# Patient Record
Sex: MALE | Race: WHITE | NOT HISPANIC OR LATINO | Employment: OTHER | URBAN - METROPOLITAN AREA
[De-identification: names, ages, dates, MRNs, and addresses within clinical notes are randomized per-mention and may not be internally consistent; named-entity substitution may affect disease eponyms.]

---

## 2017-03-07 DIAGNOSIS — E78.5 HYPERLIPIDEMIA: ICD-10-CM

## 2017-03-07 RX ORDER — ATORVASTATIN CALCIUM 20 MG/1
TABLET, FILM COATED ORAL
Qty: 90 TABLET | Refills: 0 | Status: SHIPPED | OUTPATIENT
Start: 2017-03-07 | End: 2017-06-01 | Stop reason: SDUPTHER

## 2017-06-01 DIAGNOSIS — E78.5 HYPERLIPIDEMIA: ICD-10-CM

## 2017-06-01 RX ORDER — ATORVASTATIN CALCIUM 20 MG/1
TABLET, FILM COATED ORAL
Qty: 90 TABLET | Refills: 0 | Status: SHIPPED | OUTPATIENT
Start: 2017-06-01 | End: 2017-09-11 | Stop reason: SDUPTHER

## 2017-09-11 DIAGNOSIS — E78.5 HYPERLIPIDEMIA: ICD-10-CM

## 2017-09-11 RX ORDER — ATORVASTATIN CALCIUM 20 MG/1
TABLET, FILM COATED ORAL
Qty: 90 TABLET | Refills: 0 | Status: SHIPPED | OUTPATIENT
Start: 2017-09-11 | End: 2017-12-28 | Stop reason: SDUPTHER

## 2017-09-11 RX ORDER — CARVEDILOL 25 MG/1
TABLET ORAL
Qty: 180 TABLET | Refills: 0 | Status: SHIPPED | OUTPATIENT
Start: 2017-09-11 | End: 2017-12-28 | Stop reason: SDUPTHER

## 2017-12-28 DIAGNOSIS — E78.5 HYPERLIPIDEMIA: ICD-10-CM

## 2017-12-28 RX ORDER — CARVEDILOL 25 MG/1
TABLET ORAL
Qty: 180 TABLET | Refills: 0 | Status: SHIPPED | OUTPATIENT
Start: 2017-12-28 | End: 2018-07-29 | Stop reason: SDUPTHER

## 2017-12-28 RX ORDER — ATORVASTATIN CALCIUM 20 MG/1
TABLET, FILM COATED ORAL
Qty: 90 TABLET | Refills: 0 | Status: SHIPPED | OUTPATIENT
Start: 2017-12-28 | End: 2018-03-31 | Stop reason: SDUPTHER

## 2018-01-19 ENCOUNTER — OFFICE VISIT (OUTPATIENT)
Dept: CARDIOLOGY | Facility: CLINIC | Age: 63
End: 2018-01-19

## 2018-01-19 VITALS
SYSTOLIC BLOOD PRESSURE: 156 MMHG | HEART RATE: 71 BPM | DIASTOLIC BLOOD PRESSURE: 102 MMHG | BODY MASS INDEX: 32.18 KG/M2 | HEIGHT: 67 IN | WEIGHT: 205 LBS

## 2018-01-19 DIAGNOSIS — I48.0 PAROXYSMAL ATRIAL FIBRILLATION (HCC): Primary | ICD-10-CM

## 2018-01-19 DIAGNOSIS — I10 ESSENTIAL HYPERTENSION: ICD-10-CM

## 2018-01-19 DIAGNOSIS — E78.5 HYPERLIPIDEMIA, UNSPECIFIED HYPERLIPIDEMIA TYPE: ICD-10-CM

## 2018-01-19 DIAGNOSIS — E66.9 OBESITY (BMI 30-39.9): ICD-10-CM

## 2018-01-19 PROCEDURE — 93000 ELECTROCARDIOGRAM COMPLETE: CPT | Performed by: NURSE PRACTITIONER

## 2018-01-19 PROCEDURE — 99214 OFFICE O/P EST MOD 30 MIN: CPT | Performed by: NURSE PRACTITIONER

## 2018-01-19 NOTE — PROGRESS NOTES
Date of Office Visit: 2018  Encounter Provider: ROSEANNE Ashby  Place of Service: [facility]  Patient Name: Elmer Groves  :1955    Chief Complaint   Patient presents with   • Atrial Fibrillation   :     HPI: Elmer Groves is a 62 y.o. male who is a patient of Dr. David's with a past medical history of PAF, s/p PVI x 2, HLD, HTN and obesity. He was last seen by Dr. David in 2016 and was doing well with no episodes at that time. He presents today for routine follow up.     He is doing well. No palpitations or AF episodes. He denies chest pain, shortness of breath, dizziness or syncope. 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 No issues. He has gained a few pounds since he was last here.        Past Medical History:   Diagnosis Date   • Anal fistula     s/p colorectal eval 2015, pt refused intervention   • Atrial fibrillation    • Atrial fibrillation     s/p pulmonary vein isoloation x2; sees Dr. David   • Atrial fibrillation with RVR    • Hyperlipidemia    • Hypertension    • Impaired fasting glucose    • Obesity        Past Surgical History:   Procedure Laterality Date   • ANAL FISTULOTOMY  2016   • CARDIAC CATHETERIZATION     • COLONOSCOPY  2016    POLYP   • OTHER SURGICAL HISTORY      catheter ablation atrial fibrillation 2x   • OTHER SURGICAL HISTORY  2016    INTERNAL HEMORRHOID LIGATION   • SHOULDER SURGERY Left     fell off roof, had shoulder reconstruction       Social History     Social History   • Marital status:      Spouse name: N/A   • Number of children: 3   • Years of education: 12     Occupational History   • [employer]      self-employed     Social History Main Topics   • Smoking status: Never Smoker   • Smokeless tobacco: Never Used   • Alcohol use Yes      Comment: 3 daily, past heavier use   • Drug use: No   • Sexual activity: Yes     Partners: Female     Birth control/ protection: None      Other Topics Concern   • Not on file     Social History Narrative    Diet - Variable, fast food at lunch, healthier breakfast and dinners.    Exercise - none       Family History   Problem Relation Age of Onset   • Hyperlipidemia Mother    • Coronary artery disease Father      arteriosclerotic cardiovascular disease, cerebral infarction   • Hypertension Father    • Stroke Father      cerebral infarction   • COPD Other        Review of Systems   Constitution: Negative for chills, fever, malaise/fatigue, weight gain and weight loss.   HENT: Negative for ear pain, hearing loss, nosebleeds and sore throat.    Eyes: Negative for double vision, pain and visual disturbance.   Cardiovascular: Negative for chest pain, dyspnea on exertion, irregular heartbeat, leg swelling, near-syncope, orthopnea, palpitations, paroxysmal nocturnal dyspnea and syncope.   Respiratory: Negative for cough, shortness of breath, sleep disturbances due to breathing, snoring and wheezing.    Endocrine: Negative for cold intolerance, heat intolerance and polyuria.   Skin: Negative for itching and rash.   Musculoskeletal: Negative for joint pain, joint swelling and myalgias.   Gastrointestinal: Negative for abdominal pain, diarrhea, melena, nausea and vomiting.   Genitourinary: Negative for frequency, hematuria and hesitancy.   Neurological: Negative for excessive daytime sleepiness, headaches, light-headedness, numbness, paresthesias and seizures.   Psychiatric/Behavioral: Negative for altered mental status and depression.   Allergic/Immunologic: Negative.    All other systems reviewed and are negative.      No Known Allergies      Current Outpatient Prescriptions:   •  acetaminophen (TYLENOL) 500 MG tablet, 2 tabs PO Q 8 hours PRN, Disp: 30 tablet, Rfl: 0  •  aspirin 81 MG tablet, Take 1 tablet by mouth daily., Disp: , Rfl:   •  atorvastatin (LIPITOR) 20 MG tablet, TAKE 1 TABLET BY MOUTH EVERY DAY, Disp: 90 tablet, Rfl: 0  •  carvedilol (COREG)  "25 MG tablet, TAKE 1 TABLET BY MOUTH TWICE DAILY. FOLLOW UP FOR FURTHER REFILLS, Disp: 180 tablet, Rfl: 0     Objective:     Vitals:    01/19/18 0846   BP: (!) 156/102   Pulse: 71   Weight: 93 kg (205 lb)   Height: 170.2 cm (67.01\")     Body mass index is 32.1 kg/(m^2).    PHYSICAL EXAM:    Vitals Reviewed.   General Appearance: No acute distress, well developed and well nourished.   Eyes: Conjunctiva and lids: No erythema, swelling, or discharge. Sclera non-icteric.   HENT: Atraumatic, normocephalic. External eyes, ears, and nose normal.  Respiratory: No signs of respiratory distress. Respiration rhythm and depth normal.   Clear to auscultation. No rales, crackles, rhonchi, or wheezing auscultated.   Cardiovascular:  Jugular Venous Pressure: Normal  Heart Rate and Rhythm: Normal, Heart Sounds: Normal S1 and S2. No S3 or S4 noted.  Murmurs: No murmurs noted. No rubs, thrills, or gallops.   Arterial Pulses:  Posterior tibialis and dorsalis pedis pulses normal.   Lower Extremities: No edema noted.  Gastrointestinal:  Abdomen soft, non-distended, non-tender.  Musculoskeletal: Normal movement of extremities  Skin and Nails: General appearance normal. No pallor, cyanosis, diaphoresis. Skin temperature normal. No clubbing of fingernails.   Psychiatric: Patient alert and oriented to person, place, and time. Speech and behavior appropriate. Normal mood and affect.       ECG 12 Lead  Date/Time: 1/19/2018 9:02 AM  Performed by: MARK NEWTON  Authorized by: MARK NEWTON   Comparison: compared with previous ECG from 7/8/2016  Similar to previous ECG  Rhythm: sinus rhythm  Clinical impression: normal ECG              Assessment:       Diagnosis Plan   1. Paroxysmal atrial fibrillation     2. Essential hypertension     3. Hyperlipidemia, unspecified hyperlipidemia type     4. Obesity (BMI 30-39.9)            Plan:       1. Atrial Fibrillation and Atrial Flutter  Assessment  • The patient has paroxysmal atrial fibrillation  • " The patient's CHADS2-VASc score is 1  • A [id number] score of 1 is considered an intermediate risk for a thromboembolic event  • SR. No episodes since his second ablation. CV =1 but not episodes so per Dr. David's last note, think he is okay off AC.    Plan  • Attempt to maintain sinus rhythm  • Continue beta blocker for rhythm control    Subjective - Objective  • The patient had atrial fibrillation ablation       2. HTN, blood pressure high today in the office. He does not check his blood pressure that often. Sometimes he checks it at the [place] or somewhere like that and he thinks the last couple of times it may have been running a little high. He is going to monitor it for the next couple of weeks and he is thinking about getting a b/p cuff. If it is consistently running over 130/80 he is going to follow up with Dr. Gallegos. I did discuss the importance of lifestyle changes such as weight loss and exercise.     3. HLD, statin per Dr. Gallegos.    4. For the problem of overweight/obesity, we discussed the importance of lifestyle measures and strategies for weight loss, such as improved nutrition, regular exercise and sleep hygiene.     Return to see Dr. David in 18 months or sooner should the need arise.    As always, it has been a pleasure to participate in your patient's care.      Sincerely,         ROSEANNE Coles

## 2018-03-31 DIAGNOSIS — E78.5 HYPERLIPIDEMIA: ICD-10-CM

## 2018-04-02 RX ORDER — ATORVASTATIN CALCIUM 20 MG/1
TABLET, FILM COATED ORAL
Qty: 90 TABLET | Refills: 0 | Status: SHIPPED | OUTPATIENT
Start: 2018-04-02 | End: 2018-07-29 | Stop reason: SDUPTHER

## 2018-07-29 DIAGNOSIS — E78.5 HYPERLIPIDEMIA: ICD-10-CM

## 2018-07-30 RX ORDER — ATORVASTATIN CALCIUM 20 MG/1
TABLET, FILM COATED ORAL
Qty: 90 TABLET | Refills: 0 | Status: SHIPPED | OUTPATIENT
Start: 2018-07-30 | End: 2021-04-19

## 2018-07-30 RX ORDER — CARVEDILOL 25 MG/1
TABLET ORAL
Qty: 180 TABLET | Refills: 0 | Status: SHIPPED | OUTPATIENT
Start: 2018-07-30 | End: 2018-12-06 | Stop reason: SDUPTHER

## 2018-12-06 DIAGNOSIS — E78.5 HYPERLIPIDEMIA: ICD-10-CM

## 2018-12-06 RX ORDER — ATORVASTATIN CALCIUM 20 MG/1
TABLET, FILM COATED ORAL
Qty: 90 TABLET | Refills: 0 | OUTPATIENT
Start: 2018-12-06

## 2018-12-06 RX ORDER — CARVEDILOL 25 MG/1
TABLET ORAL
Qty: 180 TABLET | Refills: 0 | Status: SHIPPED | OUTPATIENT
Start: 2018-12-06 | End: 2019-06-10 | Stop reason: SDUPTHER

## 2019-06-10 RX ORDER — CARVEDILOL 25 MG/1
TABLET ORAL
Qty: 180 TABLET | Refills: 0 | Status: SHIPPED | OUTPATIENT
Start: 2019-06-10 | End: 2019-11-01 | Stop reason: SDUPTHER

## 2019-11-01 RX ORDER — CARVEDILOL 25 MG/1
TABLET ORAL
Qty: 180 TABLET | Refills: 0 | Status: SHIPPED | OUTPATIENT
Start: 2019-11-01 | End: 2020-03-12

## 2020-03-12 RX ORDER — CARVEDILOL 25 MG/1
TABLET ORAL
Qty: 180 TABLET | Refills: 0 | Status: SHIPPED | OUTPATIENT
Start: 2020-03-12 | End: 2020-09-14

## 2020-09-10 RX ORDER — CARVEDILOL 25 MG/1
TABLET ORAL
Qty: 180 TABLET | Refills: 0 | OUTPATIENT
Start: 2020-09-10

## 2020-09-14 RX ORDER — CARVEDILOL 25 MG/1
TABLET ORAL
Qty: 180 TABLET | Refills: 0 | Status: SHIPPED | OUTPATIENT
Start: 2020-09-14 | End: 2021-03-08

## 2021-02-19 RX ORDER — CARVEDILOL 25 MG/1
TABLET ORAL
Qty: 180 TABLET | Refills: 0 | OUTPATIENT
Start: 2021-02-19

## 2021-02-19 NOTE — TELEPHONE ENCOUNTER
Needs to go to his PCP since he is not seeing us ---if he wants to schedule appt then can fill til appt

## 2021-02-19 NOTE — TELEPHONE ENCOUNTER
Coreg 25 mg   Last office visit 1/18/2018  No office visit's have been scheduled.  Last EKG 1/19/2018

## 2021-02-22 NOTE — TELEPHONE ENCOUNTER
Patient called and made appt. It has been more than 3 years, so he is considered new. Can we refill his medication until he is seen?     Thanks  Saira

## 2021-03-08 RX ORDER — CARVEDILOL 25 MG/1
TABLET ORAL
Qty: 180 TABLET | Refills: 0 | Status: SHIPPED | OUTPATIENT
Start: 2021-03-08 | End: 2021-09-20

## 2021-03-19 ENCOUNTER — BULK ORDERING (OUTPATIENT)
Dept: CASE MANAGEMENT | Facility: OTHER | Age: 66
End: 2021-03-19

## 2021-03-19 DIAGNOSIS — Z23 IMMUNIZATION DUE: ICD-10-CM

## 2021-04-19 ENCOUNTER — OFFICE VISIT (OUTPATIENT)
Dept: CARDIOLOGY | Facility: CLINIC | Age: 66
End: 2021-04-19

## 2021-04-19 VITALS
WEIGHT: 209 LBS | HEIGHT: 67 IN | HEART RATE: 80 BPM | SYSTOLIC BLOOD PRESSURE: 152 MMHG | DIASTOLIC BLOOD PRESSURE: 92 MMHG | BODY MASS INDEX: 32.8 KG/M2

## 2021-04-19 DIAGNOSIS — I10 ESSENTIAL HYPERTENSION: ICD-10-CM

## 2021-04-19 DIAGNOSIS — I48.0 PAROXYSMAL ATRIAL FIBRILLATION (HCC): Primary | ICD-10-CM

## 2021-04-19 PROCEDURE — 93000 ELECTROCARDIOGRAM COMPLETE: CPT | Performed by: INTERNAL MEDICINE

## 2021-04-19 PROCEDURE — 99203 OFFICE O/P NEW LOW 30 MIN: CPT | Performed by: INTERNAL MEDICINE

## 2021-04-19 NOTE — PROGRESS NOTES
Date of Office Visit: 2021  Encounter Provider: Roman David MD  Place of Service: [facility]  Patient Name: Elmer Groves  : 1955    Subjective:     Encounter Date:2021      Patient ID: Elmer Groves is a 65 y.o. male who has a cc of  History of PAF and i've done two PVI, last many years ago, Before .     He feels fine and the only reason he is here is b/c we wouldn't fill his carvedilol     The patient had a good year.   No anginal chest pain,   No sig tinoco,   No soa,   No fainting,  No orthostasis.   No edema.   Exercise tolerance: good. He is a  and is active.     There have been no hospital admission since the last visit.     There have been no bleeding events.       Past Medical History:   Diagnosis Date   • Anal fistula     s/p colorectal eval 2015, pt refused intervention   • Atrial fibrillation (CMS/HCC)    • Atrial fibrillation (CMS/HCC)     s/p pulmonary vein isoloation x2; sees Dr. David   • Atrial fibrillation with RVR (CMS/HCC)    • Hyperlipidemia    • Hypertension    • Impaired fasting glucose    • Obesity        Social History     Socioeconomic History   • Marital status:      Spouse name: Not on file   • Number of children: 3   • Years of education: 12   • Highest education level: Not on file   Tobacco Use   • Smoking status: Never Smoker   • Smokeless tobacco: Never Used   Vaping Use   • Vaping Use: Never used   Substance and Sexual Activity   • Alcohol use: Yes     Comment: 3 daily, past heavier use   • Drug use: No   • Sexual activity: Yes     Partners: Female     Birth control/protection: None       Family History   Problem Relation Age of Onset   • Hyperlipidemia Mother    • Coronary artery disease Father         arteriosclerotic cardiovascular disease, cerebral infarction   • Hypertension Father    • Stroke Father         cerebral infarction   • COPD Other        Review of Systems   Constitutional: Negative for fever  "and night sweats.   HENT: Negative for ear pain and stridor.    Eyes: Negative for discharge and visual halos.   Cardiovascular: Negative for cyanosis.   Respiratory: Negative for hemoptysis and sputum production.    Hematologic/Lymphatic: Negative for adenopathy.   Skin: Negative for nail changes and unusual hair distribution.   Musculoskeletal: Positive for arthritis and back pain. Negative for gout and joint swelling.   Gastrointestinal: Negative for bowel incontinence and flatus.   Genitourinary: Negative for dysuria and flank pain.   Neurological: Negative for seizures and tremors.   Psychiatric/Behavioral: Negative for altered mental status. The patient is not nervous/anxious.             Objective:     Vitals:    04/19/21 0948   BP: 152/92   Pulse: 80   Weight: 94.8 kg (209 lb)   Height: 170.2 cm (67\")         Eyes:      General:         Right eye: No discharge.         Left eye: No discharge.   HENT:      Head: Normocephalic and atraumatic.   Neck:      Thyroid: No thyromegaly.      Vascular: No JVD.   Pulmonary:      Effort: Pulmonary effort is normal.      Breath sounds: Normal breath sounds. No rales.   Cardiovascular:      Normal rate. Regular rhythm.      No gallop.   Edema:     Peripheral edema absent.   Abdominal:      General: Bowel sounds are normal.      Palpations: Abdomen is soft.      Tenderness: There is no abdominal tenderness.   Musculoskeletal: Normal range of motion.         General: No deformity. Skin:     General: Skin is warm and dry.      Findings: No erythema.   Neurological:      Mental Status: Alert and oriented to person, place, and time.      Motor: Normal muscle tone.   Psychiatric:         Behavior: Behavior normal.         Thought Content: Thought content normal.           ECG 12 Lead    Date/Time: 4/19/2021 10:14 AM  Performed by: Roman David MD  Authorized by: Roman David MD   Comparison: compared with previous ECG   Similar to previous ECG  Rhythm: sinus rhythm  Rate: " normal  Conduction: conduction normal  ST Segments: ST segments normal  T Waves: T waves normal  QRS axis: normal    Clinical impression: normal ECG            Lab Review:       Assessment:          Diagnosis Plan   1. Paroxysmal atrial fibrillation (CMS/HCC)     2. Essential hypertension            Plan:     AF -- none since 2013.     HTN -- his blood pressure is elevated. I rec he check it at home and make fu with internist.     Body mass index is 32.73 kg/m². -- we disc weight loss.     The data now suggest no benefit for asa in patients without established ASCVD>

## 2021-09-20 RX ORDER — CARVEDILOL 25 MG/1
TABLET ORAL
Qty: 180 TABLET | Refills: 2 | Status: SHIPPED | OUTPATIENT
Start: 2021-09-20

## 2023-04-04 RX ORDER — CARVEDILOL 25 MG/1
TABLET ORAL
Qty: 180 TABLET | Refills: 0 | OUTPATIENT
Start: 2023-04-04

## 2023-04-06 RX ORDER — CARVEDILOL 25 MG/1
TABLET ORAL
Qty: 180 TABLET | Refills: 0 | OUTPATIENT
Start: 2023-04-06

## 2023-04-11 RX ORDER — CARVEDILOL 25 MG/1
TABLET ORAL
Qty: 180 TABLET | Refills: 0 | OUTPATIENT
Start: 2023-04-11

## 2023-04-12 ENCOUNTER — TELEPHONE (OUTPATIENT)
Dept: CARDIOLOGY | Facility: CLINIC | Age: 68
End: 2023-04-12

## 2023-04-12 NOTE — TELEPHONE ENCOUNTER
Caller: Elmer Groves    Relationship: Self    Best call back number: [phone]  What is the best time to reach you: ANY    Who are you requesting to speak with (clinical staff, provider,  specific staff member): CLINICAL    What was the call regarding: PATIENT WAS CALLING IN TO MAKE A FOLLOW UP APPT AS PATIENT TRIED REQUESTING REFILLS OF CARVEDILOL BUT WAS DENIED SINCE PATIENT NEEDED AN APPOINTMENT.     Do you require a callback: YES

## 2023-04-13 RX ORDER — CARVEDILOL 25 MG/1
TABLET ORAL
Qty: 180 TABLET | Refills: 0 | Status: SHIPPED | OUTPATIENT
Start: 2023-04-13

## 2023-08-21 RX ORDER — CARVEDILOL 25 MG/1
TABLET ORAL
Qty: 180 TABLET | Refills: 0 | Status: SHIPPED | OUTPATIENT
Start: 2023-08-21

## 2023-09-08 ENCOUNTER — OFFICE VISIT (OUTPATIENT)
Dept: CARDIOLOGY | Facility: CLINIC | Age: 68
End: 2023-09-08
Payer: MEDICARE

## 2023-09-08 VITALS
WEIGHT: 198 LBS | BODY MASS INDEX: 31.08 KG/M2 | HEART RATE: 72 BPM | DIASTOLIC BLOOD PRESSURE: 100 MMHG | HEIGHT: 67 IN | SYSTOLIC BLOOD PRESSURE: 152 MMHG

## 2023-09-08 DIAGNOSIS — Z86.79 HISTORY OF ATRIAL FIBRILLATION: ICD-10-CM

## 2023-09-08 DIAGNOSIS — I10 PRIMARY HYPERTENSION: Primary | ICD-10-CM

## 2023-09-08 PROCEDURE — 93000 ELECTROCARDIOGRAM COMPLETE: CPT | Performed by: INTERNAL MEDICINE

## 2023-09-08 PROCEDURE — 99214 OFFICE O/P EST MOD 30 MIN: CPT | Performed by: INTERNAL MEDICINE

## 2023-09-08 NOTE — PROGRESS NOTES
Date of Office Visit: 2023  Encounter Provider: Roman David MD  Place of Service: [facility]ARD[facility]  Patient Name: Elmer Groves  : 1955    Subjective:     Encounter Date:2023      Patient ID: Elmer Groves is a 68 y.o. male who has a cc of  history of af and I did PVI in  and no AF since. 10 years. No AF.     He has HTN and has been getting carvedilol from us. They made appt b/c of refills.     The patient had a good year.   No anginal chest pain,   No sig tinoco,   No soa,   No fainting,  No orthostasis.   No edema.   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     There have been no hospital admission since the last visit.     There have been no bleeding events.       Past Medical History:   Diagnosis Date    Anal fistula     s/p colorectal eval 2015, pt refused intervention    Atrial fibrillation     Atrial fibrillation     s/p pulmonary vein isoloation x2; sees Dr. David    Atrial fibrillation with RVR     Hyperlipidemia     Hypertension     Impaired fasting glucose     Obesity        Social History     Socioeconomic History    Marital status:     Number of children: 3    Years of education: 12   Tobacco Use    Smoking status: Never    Smokeless tobacco: Never   Vaping Use    Vaping Use: Never used   Substance and Sexual Activity    Alcohol use: Yes     Comment: 3 daily, past heavier use    Drug use: No    Sexual activity: Yes     Partners: Female     Birth control/protection: None       Family History   Problem Relation Age of Onset    Hyperlipidemia Mother     Coronary artery disease Father         arteriosclerotic cardiovascular disease, cerebral infarction    Hypertension Father     Stroke Father         cerebral infarction    COPD Other        Review of Systems   Constitutional: Negative for fever and night sweats.   HENT:  Negative for ear pain and stridor.    Eyes:  Negative for discharge and visual halos.   Cardiovascular:  Negative for  "cyanosis.   Respiratory:  Negative for hemoptysis and sputum production.    Hematologic/Lymphatic: Negative for adenopathy.   Skin:  Negative for nail changes and unusual hair distribution.   Musculoskeletal:  Negative for gout and joint swelling.   Gastrointestinal:  Negative for bowel incontinence and flatus.   Genitourinary:  Negative for dysuria and flank pain.   Neurological:  Negative for seizures and tremors.   Psychiatric/Behavioral:  Negative for altered mental status. The patient is not nervous/anxious.           Objective:     Vitals:    09/08/23 1019   BP: 152/100   Pulse: 72   Weight: 89.8 kg (198 lb)   Height: 170.2 cm (67\")         Eyes:      General:         Right eye: No discharge.         Left eye: No discharge.   HENT:      Head: Normocephalic and atraumatic.   Neck:      Thyroid: No thyromegaly.      Vascular: No JVD.   Pulmonary:      Effort: Pulmonary effort is normal.      Breath sounds: Normal breath sounds. No rales.   Cardiovascular:      Normal rate. Regular rhythm.      No gallop.    Edema:     Peripheral edema absent.   Abdominal:      General: Bowel sounds are normal.      Palpations: Abdomen is soft.      Tenderness: There is no abdominal tenderness.   Musculoskeletal: Normal range of motion.         General: No deformity. Skin:     General: Skin is warm and dry.      Findings: No erythema.   Neurological:      Mental Status: Alert and oriented to person, place, and time.      Motor: Normal muscle tone.   Psychiatric:         Behavior: Behavior normal.         Thought Content: Thought content normal.         ECG 12 Lead    Date/Time: 9/8/2023 10:59 AM  Performed by: Roman David MD  Authorized by: Roman David MD   Comparison: compared with previous ECG   Similar to previous ECG  Rhythm: sinus rhythm        Lab Review:       Assessment:          Diagnosis Plan   1. Primary hypertension        2. History of atrial fibrillation               Plan:     He had AF but none since " ablation     He has htn and I would rec getting a bp cuff and checking it at home and following up with primary care.

## 2023-09-08 NOTE — Clinical Note
Aneesh -- can you see this fella for primary care. He did not have a good experience with Dr. Gallegos. I think all he needs is some BP tablets. I did AF ablation on him and no AF for a decade.

## 2023-10-18 ENCOUNTER — OFFICE VISIT (OUTPATIENT)
Dept: FAMILY MEDICINE CLINIC | Facility: CLINIC | Age: 68
End: 2023-10-18
Payer: MEDICARE

## 2023-10-18 VITALS
SYSTOLIC BLOOD PRESSURE: 148 MMHG | WEIGHT: 193.5 LBS | HEART RATE: 74 BPM | OXYGEN SATURATION: 99 % | HEIGHT: 67 IN | DIASTOLIC BLOOD PRESSURE: 96 MMHG | BODY MASS INDEX: 30.37 KG/M2

## 2023-10-18 DIAGNOSIS — Z23 IMMUNIZATION DUE: ICD-10-CM

## 2023-10-18 DIAGNOSIS — I10 PRIMARY HYPERTENSION: Primary | ICD-10-CM

## 2023-10-18 DIAGNOSIS — Z23 NEED FOR TDAP VACCINATION: ICD-10-CM

## 2023-10-18 DIAGNOSIS — R73.01 IMPAIRED FASTING GLUCOSE: ICD-10-CM

## 2023-10-18 DIAGNOSIS — E78.5 HYPERLIPIDEMIA, UNSPECIFIED HYPERLIPIDEMIA TYPE: ICD-10-CM

## 2023-10-18 NOTE — PROGRESS NOTES
"        Nabil Young MD  Internal Medicine  [phone] (office)             10/18/2023    Patient Information  Elmer Groves                                                                                          [address] [facility] [id number]  1955        Chief Complaint   Patient presents with    Establish Care    Hypertension          History of Present Illness:    Elmer Groves is a 68 y.o. male who presents to the office to establish care with new PCP. Patient has obesity, HTN, HLD, prediabetes, and history of atrial fibrillation s/p ablation.     He is due for multiple immunizations (flu, RSV, Tdap, PCV20, Shingrix, and COVID-19). History of adenomatous polyps and was supposed to have repeat colonoscopy in 2018. He declines GI referral at this time.     Patient not exercising regularly.         No Known Allergies        Current Outpatient Medications:     carvedilol (COREG) 25 MG tablet, Take 1 tablet by mouth twice daily, Disp: 180 tablet, Rfl: 0      Social History     Socioeconomic History    Marital status:     Number of children: 3    Years of education: 12   Tobacco Use    Smoking status: Never    Smokeless tobacco: Never   Vaping Use    Vaping Use: Never used   Substance and Sexual Activity    Alcohol use: Yes     Comment: 3 daily, past heavier use    Drug use: No    Sexual activity: Yes     Partners: Female     Birth control/protection: None         Vitals:    10/18/23 0921   BP: 148/96   BP Location: Right arm   Patient Position: Sitting   Cuff Size: Adult   Pulse: 74   SpO2: 99%   Weight: 87.8 kg (193 lb 8 oz)   Height: 170.2 cm (67.01\")      Wt Readings from Last 3 Encounters:   10/18/23 87.8 kg (193 lb 8 oz)   09/08/23 89.8 kg (198 lb)   04/19/21 94.8 kg (209 lb)     Body mass index is 30.3 kg/m².      Physical Exam  Vitals reviewed.   Constitutional:       Appearance: Normal appearance. He is obese. He is not ill-appearing.   Pulmonary:      Effort: Pulmonary effort " is normal.   Neurological:      Mental Status: He is alert and oriented to person, place, and time.   Psychiatric:         Mood and Affect: Mood normal.         Behavior: Behavior normal.            Lab/other results: no recent labs    Assessment/Plan:    Diagnoses and all orders for this visit:    1. Primary hypertension (Primary)  -     Comprehensive Metabolic Panel  -     Microalbumin / Creatinine Urine Ratio - Urine, Clean Catch    2. Hyperlipidemia, unspecified hyperlipidemia type  Overview:  Baseline     Orders:  -     Lipid Panel  -     Apolipoprotein B    3. Impaired fasting glucose  -     Hemoglobin A1c    4. Need for Tdap vaccination  -     Tdap Vaccine => 8yo IM (BOOSTRIX)    5. Immunization due  -     Pneumococcal Conjugate Vaccine 20-Valent (PCV20)       Will check GFR and urine microalbumin/Cr before prescribing additional antihypertensive medications. Check labs and follow-up in a few weeks to review. Encouraged weight loss through better food choices and regular exercise (reviewed ACC/AHA recommendations). Discussed immunizations and cancer screening.

## 2023-10-25 LAB
ALBUMIN SERPL-MCNC: 4.7 G/DL (ref 3.9–4.9)
ALBUMIN/CREAT UR: <15 MG/G CREAT (ref 0–29)
ALBUMIN/GLOB SERPL: 1.7 {RATIO} (ref 1.2–2.2)
ALP SERPL-CCNC: 69 IU/L (ref 44–121)
ALT SERPL-CCNC: 29 IU/L (ref 0–44)
APO B SERPL-MCNC: 157 MG/DL
AST SERPL-CCNC: 25 IU/L (ref 0–40)
BILIRUB SERPL-MCNC: 0.7 MG/DL (ref 0–1.2)
BUN SERPL-MCNC: 15 MG/DL (ref 8–27)
BUN/CREAT SERPL: 18 (ref 10–24)
CALCIUM SERPL-MCNC: 9.9 MG/DL (ref 8.6–10.2)
CHLORIDE SERPL-SCNC: 97 MMOL/L (ref 96–106)
CHOLEST SERPL-MCNC: 288 MG/DL (ref 100–199)
CO2 SERPL-SCNC: 25 MMOL/L (ref 20–29)
CREAT SERPL-MCNC: 0.85 MG/DL (ref 0.76–1.27)
CREAT UR-MCNC: 19.6 MG/DL
EGFRCR SERPLBLD CKD-EPI 2021: 95 ML/MIN/1.73
GLOBULIN SER CALC-MCNC: 2.8 G/DL (ref 1.5–4.5)
GLUCOSE SERPL-MCNC: 115 MG/DL (ref 70–99)
HBA1C MFR BLD: 5.6 % (ref 4.8–5.6)
HDLC SERPL-MCNC: 75 MG/DL
LABORATORY COMMENT REPORT: ABNORMAL
LDLC SERPL CALC-MCNC: 203 MG/DL (ref 0–99)
MICROALBUMIN UR-MCNC: <3 UG/ML
POTASSIUM SERPL-SCNC: 5.2 MMOL/L (ref 3.5–5.2)
PROT SERPL-MCNC: 7.5 G/DL (ref 6–8.5)
SODIUM SERPL-SCNC: 135 MMOL/L (ref 134–144)
TRIGL SERPL-MCNC: 67 MG/DL (ref 0–149)
VLDLC SERPL CALC-MCNC: 10 MG/DL (ref 5–40)

## 2023-10-26 ENCOUNTER — TELEPHONE (OUTPATIENT)
Dept: FAMILY MEDICINE CLINIC | Facility: CLINIC | Age: 68
End: 2023-10-26
Payer: MEDICARE

## 2023-10-26 NOTE — TELEPHONE ENCOUNTER
----- Message from Tucker Yougn MD sent at 10/25/2023  7:30 AM EDT -----  Regarding: FW:  Please inform patient that labs are notable for extremely high cholesterol. We will review medication options in clinic. Make sure patient has follow up within next few weeks. Thanks!  ----- Message -----  From: Masoud, Reflab Results In  Sent: 10/25/2023   5:08 AM EDT  To: Tucker Young MD

## 2023-10-26 NOTE — TELEPHONE ENCOUNTER
"Relay     \"Please inform patient that labs are notable for extremely high cholesterol. We will review medication options in clinic. \"                "

## 2023-11-01 ENCOUNTER — OFFICE VISIT (OUTPATIENT)
Dept: FAMILY MEDICINE CLINIC | Facility: CLINIC | Age: 68
End: 2023-11-01
Payer: MEDICARE

## 2023-11-01 VITALS
BODY MASS INDEX: 30.48 KG/M2 | SYSTOLIC BLOOD PRESSURE: 138 MMHG | OXYGEN SATURATION: 97 % | HEART RATE: 82 BPM | RESPIRATION RATE: 16 BRPM | WEIGHT: 194.2 LBS | HEIGHT: 67 IN | DIASTOLIC BLOOD PRESSURE: 90 MMHG

## 2023-11-01 DIAGNOSIS — E78.5 HYPERLIPIDEMIA, UNSPECIFIED HYPERLIPIDEMIA TYPE: Primary | ICD-10-CM

## 2023-11-01 DIAGNOSIS — Z86.79 HISTORY OF ATRIAL FIBRILLATION: ICD-10-CM

## 2023-11-01 DIAGNOSIS — I10 PRIMARY HYPERTENSION: ICD-10-CM

## 2023-11-01 DIAGNOSIS — E66.9 OBESITY (BMI 30-39.9): ICD-10-CM

## 2023-11-01 PROCEDURE — 3080F DIAST BP >= 90 MM HG: CPT | Performed by: INTERNAL MEDICINE

## 2023-11-01 PROCEDURE — 1170F FXNL STATUS ASSESSED: CPT | Performed by: INTERNAL MEDICINE

## 2023-11-01 PROCEDURE — G0438 PPPS, INITIAL VISIT: HCPCS | Performed by: INTERNAL MEDICINE

## 2023-11-01 PROCEDURE — 1159F MED LIST DOCD IN RCRD: CPT | Performed by: INTERNAL MEDICINE

## 2023-11-01 PROCEDURE — 3075F SYST BP GE 130 - 139MM HG: CPT | Performed by: INTERNAL MEDICINE

## 2023-11-01 PROCEDURE — 1160F RVW MEDS BY RX/DR IN RCRD: CPT | Performed by: INTERNAL MEDICINE

## 2023-11-01 PROCEDURE — 99214 OFFICE O/P EST MOD 30 MIN: CPT | Performed by: INTERNAL MEDICINE

## 2023-11-01 RX ORDER — ROSUVASTATIN CALCIUM 40 MG/1
40 TABLET, COATED ORAL NIGHTLY
Qty: 90 TABLET | Refills: 3 | Status: SHIPPED | OUTPATIENT
Start: 2023-11-01 | End: 2024-10-26

## 2023-11-01 RX ORDER — AMLODIPINE BESYLATE 10 MG/1
10 TABLET ORAL DAILY
Qty: 90 TABLET | Refills: 3 | Status: SHIPPED | OUTPATIENT
Start: 2023-11-01

## 2023-11-01 RX ORDER — METOPROLOL SUCCINATE 25 MG/1
25 TABLET, EXTENDED RELEASE ORAL DAILY
Qty: 90 TABLET | Refills: 3 | Status: SHIPPED | OUTPATIENT
Start: 2023-11-01

## 2023-11-01 NOTE — ASSESSMENT & PLAN NOTE
Discontinue carvedilol in favor of Toprol XL. Since ablation no evidence of atrial fibrillation so it's not clear that he requires continued beta blocker. Will use this for the next month and plan to discontinue soon and monitor.

## 2023-11-01 NOTE — PROGRESS NOTES
The ABCs of the Annual Wellness Visit  Subsequent Medicare Wellness Visit    Subjective    Elmer Groves is a 68 y.o. male who presents for a Subsequent Medicare Wellness Visit.    The following portions of the patient's history were reviewed and   updated as appropriate: allergies, current medications, past family history, past medical history, past social history, past surgical history, and problem list.    Compared to one year ago, the patient feels his physical   health is the same.    Compared to one year ago, the patient feels his mental   health is the same.    Recent Hospitalizations:  He was not admitted to the hospital during the last year.       Current Medical Providers:  Patient Care Team:  Tucker Young MD as PCP - General (Internal Medicine)    Outpatient Medications Prior to Visit   Medication Sig Dispense Refill    carvedilol (COREG) 25 MG tablet Take 1 tablet by mouth twice daily 180 tablet 0     No facility-administered medications prior to visit.       No opioid medication identified on active medication list. I have reviewed chart for other potential  high risk medication/s and harmful drug interactions in the elderly.        Aspirin is not on active medication list.  Aspirin use is not indicated based on review of current medical condition/s. Risk of harm outweighs potential benefits.  .    Patient Active Problem List   Diagnosis    Anorectal abscess    Anorectal fistula    Hypertension    Impaired fasting glucose    Hyperlipidemia    Vitamin D deficiency    Pain of left lower extremity    Myelopathy of lumbar region    Muscle weakness of lower extremity    Lumbar disc herniation with myelopathy    Obesity (BMI 30-39.9)    History of atrial fibrillation     Advance Care Planning   Advance Care Planning     Advance Directive is not on file.  ACP discussion was held with the patient during this visit. Patient does not have an advance directive, information provided.     Objective   "  Vitals:    23 0856   BP: 138/90   BP Location: Right arm   Patient Position: Sitting   Cuff Size: Adult   Pulse: 82   Resp: 16   SpO2: 97%   Weight: 88.1 kg (194 lb 3.2 oz)   Height: 170.2 cm (67.01\")     Estimated body mass index is 30.41 kg/m² as calculated from the following:    Height as of this encounter: 170.2 cm (67.01\").    Weight as of this encounter: 88.1 kg (194 lb 3.2 oz).    BMI is >= 30 and <35. (Class 1 Obesity). The following options were offered after discussion;: exercise counseling/recommendations and nutrition counseling/recommendations      Does the patient have evidence of cognitive impairment? No    Lab Results   Component Value Date    CHLPL 288 (H) 10/18/2023    TRIG 67 10/18/2023    HDL 75 10/18/2023     (H) 10/18/2023    VLDL 10 10/18/2023    HGBA1C 5.6 10/18/2023        HEALTH RISK ASSESSMENT    Smoking Status:  Social History     Tobacco Use   Smoking Status Never   Smokeless Tobacco Never     Alcohol Consumption:  Social History     Substance and Sexual Activity   Alcohol Use Yes    Comment: 3 daily, past heavier use     Fall Risk Screen:    RAMIREZ Fall Risk Assessment was completed, and patient is at LOW risk for falls.Assessment completed on:2023    Depression Screenin/1/2023     8:55 AM   PHQ-2/PHQ-9 Depression Screening   Little Interest or Pleasure in Doing Things 0-->not at all   Feeling Down, Depressed or Hopeless 0-->not at all   PHQ-9: Brief Depression Severity Measure Score 0       Health Habits and Functional and Cognitive Screenin/1/2023     8:55 AM   Functional & Cognitive Status   Do you have difficulty preparing food and eating? No   Do you have difficulty bathing yourself, getting dressed or grooming yourself? No   Do you have difficulty using the toilet? No   Do you have difficulty moving around from place to place? No   Do you have trouble with steps or getting out of a bed or a chair? No   Current Diet Limited Junk Food   Dental " Exam Not up to date   Eye Exam Up to date   Exercise (times per week) 0 times per week   Current Exercises Include Yard Work   Do you need help using the phone?  No   Are you deaf or do you have serious difficulty hearing?  No   Do you need help to go to places out of walking distance? No   Do you need help shopping? No   Do you need help preparing meals?  No   Do you need help with housework?  No   Do you need help with laundry? No   Do you need help taking your medications? No   Do you need help managing money? No   Do you ever drive or ride in a car without wearing a seat belt? No   Have you felt unusual stress, anger or loneliness in the last month? No   Who do you live with? Spouse   If you need help, do you have trouble finding someone available to you? No   Have you been bothered in the last four weeks by sexual problems? No   Do you have difficulty concentrating, remembering or making decisions? No       Age-appropriate Screening Schedule:  Refer to the list below for future screening recommendations based on patient's age, sex and/or medical conditions. Orders for these recommended tests are listed in the plan section. The patient has been provided with a written plan.    Health Maintenance   Topic Date Due    COLORECTAL CANCER SCREENING  Never done    COVID-19 Vaccine (1) Never done    ZOSTER VACCINE (1 of 2) Never done    INFLUENZA VACCINE  03/31/2024 (Originally 8/1/2023)    LIPID PANEL  10/18/2024    ANNUAL WELLNESS VISIT  11/01/2024    BMI FOLLOWUP  11/01/2024    TDAP/TD VACCINES (3 - Td or Tdap) 10/18/2033    HEPATITIS C SCREENING  Completed    Pneumococcal Vaccine 65+  Completed                  CMS Preventative Services Quick Reference  Risk Factors Identified During Encounter  Immunizations Discussed/Encouraged: Influenza, COVID19, and RSV (Respiratory Syncytial Virus)  The above risks/problems have been discussed with the patient.  Pertinent information has been shared with the patient in the  "After Visit Summary.  An After Visit Summary and PPPS were made available to the patient.    Follow Up:   Next Medicare Wellness visit to be scheduled in 1 year.       Additional E&M Note during same encounter follows:  Patient has multiple medical problems which are significant and separately identifiable that require additional work above and beyond the Medicare Wellness Visit.      Chief Complaint  Medicare Wellness-subsequent    Subjective          Elmer Groves is also being seen today for HTN and HLD. He reports taking carvedilol once daily. This was prescribed by cardiologist after previous episode of atrial fibrillation. He underwent ablation for this.          Objective   Vital Signs:  /90 (BP Location: Right arm, Patient Position: Sitting, Cuff Size: Adult)   Pulse 82   Resp 16   Ht 170.2 cm (67.01\")   Wt 88.1 kg (194 lb 3.2 oz)   SpO2 97%   BMI 30.41 kg/m²     Physical Exam  Vitals reviewed.   Constitutional:       Appearance: Normal appearance. He is obese. He is not ill-appearing.   Neurological:      Mental Status: He is alert and oriented to person, place, and time.   Psychiatric:         Mood and Affect: Mood normal.         Behavior: Behavior normal.          The following data was reviewed by: Tucker Young MD on 11/01/2023:  Common labs          10/18/2023    10:45   Common Labs   Glucose 115    BUN 15    Creatinine 0.85    Sodium 135    Potassium 5.2    Chloride 97    Calcium 9.9    Total Protein 7.5    Albumin 4.7    Total Bilirubin 0.7    Alkaline Phosphatase 69    AST (SGOT) 25    ALT (SGPT) 29    Total Cholesterol 288    Triglycerides 67    HDL Cholesterol 75    LDL Cholesterol  203    Hemoglobin A1C 5.6    Microalbumin, Urine <3.0             Assessment and Plan   Diagnoses and all orders for this visit:    1. Hyperlipidemia, unspecified hyperlipidemia type (Primary)  Assessment & Plan:  Lipid abnormalities are unchanged.  Pharmacotherapy as ordered.  Lipids will be " reassessed in 1 year.    Orders:  -     rosuvastatin (CRESTOR) 40 MG tablet; Take 1 tablet by mouth Every Night for 360 days.  Dispense: 90 tablet; Refill: 3    2. Obesity (BMI 30-39.9)  Assessment & Plan:  Patient's (Body mass index is 30.41 kg/m².) indicates that they are obese (BMI >30) with health conditions that include hypertension, dyslipidemias, and prediabetes  . Weight is unchanged. BMI  is above average; BMI management plan is completed. We discussed portion control and increasing exercise.       3. Primary hypertension  Assessment & Plan:  Hypertension is worsening.  Dietary sodium restriction.  Weight loss.  Regular aerobic exercise.  Medication changes per orders.  Blood pressure will be reassessed in 4 weeks.    Orders:  -     amLODIPine (NORVASC) 10 MG tablet; Take 1 tablet by mouth Daily.  Dispense: 90 tablet; Refill: 3    4. History of atrial fibrillation  Assessment & Plan:  Discontinue carvedilol in favor of Toprol XL. Since ablation no evidence of atrial fibrillation so it's not clear that he requires continued beta blocker. Will use this for the next month and plan to discontinue soon and monitor.     Orders:  -     metoprolol succinate XL (Toprol XL) 25 MG 24 hr tablet; Take 1 tablet by mouth Daily.  Dispense: 90 tablet; Refill: 3             Follow Up   No follow-ups on file.  Patient was given instructions and counseling regarding his condition or for health maintenance advice. Please see specific information pulled into the AVS if appropriate.

## 2023-11-01 NOTE — ASSESSMENT & PLAN NOTE
Patient's (Body mass index is 30.41 kg/m².) indicates that they are obese (BMI >30) with health conditions that include hypertension, dyslipidemias, and prediabetes  . Weight is unchanged. BMI  is above average; BMI management plan is completed. We discussed portion control and increasing exercise.

## 2023-12-01 ENCOUNTER — OFFICE VISIT (OUTPATIENT)
Dept: FAMILY MEDICINE CLINIC | Facility: CLINIC | Age: 68
End: 2023-12-01
Payer: MEDICARE

## 2023-12-01 VITALS
HEIGHT: 67 IN | OXYGEN SATURATION: 98 % | BODY MASS INDEX: 30.53 KG/M2 | SYSTOLIC BLOOD PRESSURE: 144 MMHG | WEIGHT: 194.5 LBS | HEART RATE: 105 BPM | RESPIRATION RATE: 16 BRPM | DIASTOLIC BLOOD PRESSURE: 80 MMHG

## 2023-12-01 DIAGNOSIS — E66.9 OBESITY (BMI 30-39.9): ICD-10-CM

## 2023-12-01 DIAGNOSIS — I10 PRIMARY HYPERTENSION: Primary | ICD-10-CM

## 2023-12-01 DIAGNOSIS — Z86.79 HISTORY OF ATRIAL FIBRILLATION: ICD-10-CM

## 2023-12-01 PROCEDURE — 3079F DIAST BP 80-89 MM HG: CPT | Performed by: INTERNAL MEDICINE

## 2023-12-01 PROCEDURE — 1160F RVW MEDS BY RX/DR IN RCRD: CPT | Performed by: INTERNAL MEDICINE

## 2023-12-01 PROCEDURE — 99214 OFFICE O/P EST MOD 30 MIN: CPT | Performed by: INTERNAL MEDICINE

## 2023-12-01 PROCEDURE — 3077F SYST BP >= 140 MM HG: CPT | Performed by: INTERNAL MEDICINE

## 2023-12-01 PROCEDURE — 93000 ELECTROCARDIOGRAM COMPLETE: CPT | Performed by: INTERNAL MEDICINE

## 2023-12-01 PROCEDURE — 1159F MED LIST DOCD IN RCRD: CPT | Performed by: INTERNAL MEDICINE

## 2023-12-01 NOTE — ASSESSMENT & PLAN NOTE
Patient's (Body mass index is 30.46 kg/m².) indicates that they are obese (BMI >30) with health conditions that include hypertension and dyslipidemias . Weight is unchanged. BMI  is above average; BMI management plan is completed. We discussed portion control, increasing exercise, and joining a fitness center or start home based exercise program.

## 2023-12-01 NOTE — PROGRESS NOTES
"        Nabil Young MD  Internal Medicine  [phone] (office)             12/01/2023    Patient Information  Elmer Groves                                                                                          [address] [facility] [address]  1955        Chief Complaint   Patient presents with    Hyperlipidemia    Hypertension     1 month follow up          History of Present Illness:    Elmer Groves is a 68 y.o. male who presents to the office for follow-up. We discontinued carvedilol at last visit in favor of Toprol XL as patient was only taking carvedilol daily. Amlodipine also added for HTN control. He has tolerated without problem. Patient reports occasionally feeling lightheaded, however. He denies chest pain, dyspnea, palpitations and doesn't believe he's been having atrial fibrillation.     Patient declines CRC.    No Known Allergies        Current Outpatient Medications:     amLODIPine (NORVASC) 10 MG tablet, Take 1 tablet by mouth Daily., Disp: 90 tablet, Rfl: 3    metoprolol succinate XL (Toprol XL) 25 MG 24 hr tablet, Take 1 tablet by mouth Daily., Disp: 90 tablet, Rfl: 3    rosuvastatin (CRESTOR) 40 MG tablet, Take 1 tablet by mouth Every Night for 360 days., Disp: 90 tablet, Rfl: 3      Social History     Socioeconomic History    Marital status:     Number of children: 3    Years of education: 12   Tobacco Use    Smoking status: Never    Smokeless tobacco: Never   Vaping Use    Vaping Use: Never used   Substance and Sexual Activity    Alcohol use: Yes     Comment: 3 daily, past heavier use    Drug use: No    Sexual activity: Yes     Partners: Female     Birth control/protection: None         Vitals:    12/01/23 0924   BP: 144/80   BP Location: Right arm   Patient Position: Sitting   Cuff Size: Adult   Pulse: 105   Resp: 16   SpO2: 98%   Weight: 88.2 kg (194 lb 8 oz)   Height: 170.2 cm (67.01\")      Wt Readings from Last 3 Encounters:   12/01/23 88.2 kg (194 lb 8 oz) "   11/01/23 88.1 kg (194 lb 3.2 oz)   10/18/23 87.8 kg (193 lb 8 oz)     Body mass index is 30.46 kg/m².      Physical Exam  Vitals reviewed. Nursing note reviewed: Repeat HR 84.  Constitutional:       Appearance: Normal appearance. He is obese.   Cardiovascular:      Rate and Rhythm: Normal rate. Rhythm irregular.      Heart sounds: Normal heart sounds. No murmur heard.     No friction rub. No gallop.      Comments: Pulse irregular  Neurological:      Mental Status: He is alert and oriented to person, place, and time.   Psychiatric:         Mood and Affect: Mood normal.         Behavior: Behavior normal.            Lab/other results:  Common labs          10/18/2023    10:45   Common Labs   Glucose 115    BUN 15    Creatinine 0.85    Sodium 135    Potassium 5.2    Chloride 97    Calcium 9.9    Total Protein 7.5    Albumin 4.7    Total Bilirubin 0.7    Alkaline Phosphatase 69    AST (SGOT) 25    ALT (SGPT) 29    Total Cholesterol 288    Triglycerides 67    HDL Cholesterol 75    LDL Cholesterol  203    Hemoglobin A1C 5.6    Microalbumin, Urine <3.0      EKG - NSR with PVC noted    Assessment/Plan:    Diagnoses and all orders for this visit:    1. Primary hypertension (Primary)  Assessment & Plan:  Hypertension is improving with treatment.  Weight loss.  Regular aerobic exercise.  Continue current medications.  Blood pressure will be reassessed in 3 months.    Patient wishes to try to get BP under control with lifestyle modifications in addition to continued amlodipine 10mg daily. We'll check again in 3 months. If above goal (<130/80mmHg), will discuss additional medication options.       2. History of atrial fibrillation  -     ECG 12 Lead    3. Obesity (BMI 30-39.9)  Assessment & Plan:  Patient's (Body mass index is 30.46 kg/m².) indicates that they are obese (BMI >30) with health conditions that include hypertension and dyslipidemias . Weight is unchanged. BMI  is above average; BMI management plan is completed. We  discussed portion control, increasing exercise, and joining a fitness center or start home based exercise program.

## 2023-12-01 NOTE — ASSESSMENT & PLAN NOTE
Hypertension is improving with treatment.  Weight loss.  Regular aerobic exercise.  Continue current medications.  Blood pressure will be reassessed in 3 months.    Patient wishes to try to get BP under control with lifestyle modifications in addition to continued amlodipine 10mg daily. We'll check again in 3 months. If above goal (<130/80mmHg), will discuss additional medication options.

## 2024-03-01 ENCOUNTER — OFFICE VISIT (OUTPATIENT)
Dept: FAMILY MEDICINE CLINIC | Facility: CLINIC | Age: 69
End: 2024-03-01
Payer: MEDICARE

## 2024-03-01 VITALS
HEIGHT: 67 IN | BODY MASS INDEX: 29.58 KG/M2 | RESPIRATION RATE: 16 BRPM | SYSTOLIC BLOOD PRESSURE: 146 MMHG | OXYGEN SATURATION: 98 % | WEIGHT: 188.5 LBS | HEART RATE: 108 BPM | DIASTOLIC BLOOD PRESSURE: 84 MMHG

## 2024-03-01 DIAGNOSIS — E78.5 HYPERLIPIDEMIA, UNSPECIFIED HYPERLIPIDEMIA TYPE: ICD-10-CM

## 2024-03-01 DIAGNOSIS — I10 PRIMARY HYPERTENSION: Primary | ICD-10-CM

## 2024-03-01 DIAGNOSIS — R73.01 IMPAIRED FASTING GLUCOSE: ICD-10-CM

## 2024-03-01 DIAGNOSIS — E66.3 OVERWEIGHT (BMI 25.0-29.9): ICD-10-CM

## 2024-03-01 PROBLEM — E66.9 OBESITY (BMI 30-39.9): Status: RESOLVED | Noted: 2018-01-19 | Resolved: 2024-03-01

## 2024-03-01 NOTE — ASSESSMENT & PLAN NOTE
Encouraged continued weight loss and adherence to rosuvastatin 40mg daily. Will check labs before appointment in  months.

## 2024-03-01 NOTE — PROGRESS NOTES
"        Nabil Young MD  Internal Medicine  [phone] (office)             03/01/2024    Patient Information  Elmer Groves                                                                                          [address] [facility] [address]  1955        Chief Complaint   Patient presents with    Hyperlipidemia     3 month follow up     Hypertension          History of Present Illness:    Elmer Groves is a 68 y.o. male who presents to the office for weight and blood pressure checks. He reports weight loss is from better food choices and regular exercise. Patient reports adherence to medication regimen.       Health Maintenance Due   Topic Date Due    COLORECTAL CANCER SCREENING  Never done    COVID-19 Vaccine (1) Never done    ZOSTER VACCINE (1 of 2) Never done    RSV Vaccine - Adults (1 - 1-dose 60+ series) Never done        No Known Allergies        Current Outpatient Medications:     amLODIPine (NORVASC) 10 MG tablet, Take 1 tablet by mouth Daily., Disp: 90 tablet, Rfl: 3    metoprolol succinate XL (Toprol XL) 25 MG 24 hr tablet, Take 1 tablet by mouth Daily., Disp: 90 tablet, Rfl: 3    rosuvastatin (CRESTOR) 40 MG tablet, Take 1 tablet by mouth Every Night for 360 days., Disp: 90 tablet, Rfl: 3      Social History     Socioeconomic History    Marital status:     Number of children: 3    Years of education: 12   Tobacco Use    Smoking status: Never    Smokeless tobacco: Never   Vaping Use    Vaping Use: Never used   Substance and Sexual Activity    Alcohol use: Yes     Comment: 3 daily, past heavier use    Drug use: No    Sexual activity: Yes     Partners: Female     Birth control/protection: None         Vitals:    03/01/24 0754   BP: 146/84   BP Location: Right arm   Patient Position: Sitting   Cuff Size: Adult   Pulse: 108   Resp: 16   SpO2: 98%   Weight: 85.5 kg (188 lb 8 oz)   Height: 170.2 cm (67.01\")      Wt Readings from Last 3 Encounters:   03/01/24 85.5 kg (188 lb 8 oz) "   12/01/23 88.2 kg (194 lb 8 oz)   11/01/23 88.1 kg (194 lb 3.2 oz)     Body mass index is 29.52 kg/m².      Physical Exam  Vitals reviewed.   Constitutional:       Appearance: Normal appearance.   Pulmonary:      Effort: Pulmonary effort is normal.   Neurological:      Mental Status: He is alert and oriented to person, place, and time.   Psychiatric:         Mood and Affect: Mood normal.         Behavior: Behavior normal.            Lab/other results:  Common labs          10/18/2023    10:45   Common Labs   Glucose 115    BUN 15    Creatinine 0.85    Sodium 135    Potassium 5.2    Chloride 97    Calcium 9.9    Total Protein 7.5    Albumin 4.7    Total Bilirubin 0.7    Alkaline Phosphatase 69    AST (SGOT) 25    ALT (SGPT) 29    Total Cholesterol 288    Triglycerides 67    HDL Cholesterol 75    LDL Cholesterol  203    Hemoglobin A1C 5.6    Microalbumin, Urine <3.0        Assessment/Plan:    Diagnoses and all orders for this visit:    1. Primary hypertension (Primary)  Assessment & Plan:  BP remains above goal (<130/80mmHg) despite recent weight loss and adherence to medications. Patient will continue weight loss attempts, keep home blood pressure log, and follow-up in 3 months (offered 6 week appointment, but he'd like to wait). If above goal on home measurements, will discuss additional treatment options.     Orders:  -     Comprehensive Metabolic Panel; Future  -     Microalbumin / Creatinine Urine Ratio - Urine, Clean Catch; Future    2. Impaired fasting glucose  Assessment & Plan:  A1c 5.8% previously, last 5.6%. Will check again with next labs.     Orders:  -     Hemoglobin A1c; Future    3. Hyperlipidemia, unspecified hyperlipidemia type  Overview:  Baseline     Assessment & Plan:  Encouraged continued weight loss and adherence to rosuvastatin 40mg daily. Will check labs before appointment in  months.     Orders:  -     Lipid Panel; Future  -     Apolipoprotein B; Future    4. Overweight (BMI  25.0-29.9)  Assessment & Plan:  Patient's (Body mass index is 29.52 kg/m².) indicates that they are overweight with health conditions that include hypertension, dyslipidemias, and prediabetes  . Weight is improving with lifestyle modifications. BMI is is above average; BMI management plan is completed. We discussed portion control, increasing exercise, and joining a fitness center or start home based exercise program.        Of note, patient continues to refuse CRC screening.

## 2024-03-01 NOTE — ASSESSMENT & PLAN NOTE
Patient's (Body mass index is 29.52 kg/m².) indicates that they are overweight with health conditions that include hypertension, dyslipidemias, and prediabetes  . Weight is improving with lifestyle modifications. BMI is is above average; BMI management plan is completed. We discussed portion control, increasing exercise, and joining a fitness center or start home based exercise program.

## 2024-06-12 DIAGNOSIS — E78.5 HYPERLIPIDEMIA, UNSPECIFIED HYPERLIPIDEMIA TYPE: ICD-10-CM

## 2024-06-12 DIAGNOSIS — E66.3 OVERWEIGHT (BMI 25.0-29.9): ICD-10-CM

## 2024-06-12 DIAGNOSIS — I10 PRIMARY HYPERTENSION: Primary | ICD-10-CM

## 2024-06-12 DIAGNOSIS — R73.01 IMPAIRED FASTING GLUCOSE: ICD-10-CM

## 2024-06-16 LAB
ALBUMIN SERPL-MCNC: 4.4 G/DL (ref 3.9–4.9)
ALBUMIN/CREAT UR: 8 MG/G CREAT (ref 0–29)
ALBUMIN/GLOB SERPL: 1.6 {RATIO}
ALP SERPL-CCNC: 85 IU/L (ref 44–121)
ALT SERPL-CCNC: 24 IU/L (ref 0–44)
APO B SERPL-MCNC: 67 MG/DL
AST SERPL-CCNC: 29 IU/L (ref 0–40)
BILIRUB SERPL-MCNC: 0.4 MG/DL (ref 0–1.2)
BUN SERPL-MCNC: 8 MG/DL (ref 8–27)
BUN/CREAT SERPL: 10 (ref 10–24)
CALCIUM SERPL-MCNC: 9.9 MG/DL (ref 8.6–10.2)
CHLORIDE SERPL-SCNC: 101 MMOL/L (ref 96–106)
CHOLEST SERPL-MCNC: 160 MG/DL (ref 100–199)
CO2 SERPL-SCNC: 23 MMOL/L (ref 20–29)
CREAT SERPL-MCNC: 0.77 MG/DL (ref 0.76–1.27)
CREAT UR-MCNC: 51.6 MG/DL
EGFRCR SERPLBLD CKD-EPI 2021: 98 ML/MIN/1.73
GLOBULIN SER CALC-MCNC: 2.8 G/DL (ref 1.5–4.5)
GLUCOSE SERPL-MCNC: 115 MG/DL (ref 70–99)
HBA1C MFR BLD: 5.7 % (ref 4.8–5.6)
HDLC SERPL-MCNC: 66 MG/DL
LDLC SERPL CALC-MCNC: 78 MG/DL (ref 0–99)
LDLC/HDLC SERPL: 1.2 RATIO (ref 0–3.6)
MICROALBUMIN UR-MCNC: 4.1 UG/ML
POTASSIUM SERPL-SCNC: 4.6 MMOL/L (ref 3.5–5.2)
PROT SERPL-MCNC: 7.2 G/DL (ref 6–8.5)
SODIUM SERPL-SCNC: 138 MMOL/L (ref 134–144)
TRIGL SERPL-MCNC: 87 MG/DL (ref 0–149)
VLDLC SERPL CALC-MCNC: 16 MG/DL (ref 5–40)

## 2024-07-10 ENCOUNTER — OFFICE VISIT (OUTPATIENT)
Dept: FAMILY MEDICINE CLINIC | Facility: CLINIC | Age: 69
End: 2024-07-10
Payer: MEDICARE

## 2024-07-10 VITALS
BODY MASS INDEX: 28.36 KG/M2 | HEIGHT: 67 IN | RESPIRATION RATE: 16 BRPM | DIASTOLIC BLOOD PRESSURE: 78 MMHG | WEIGHT: 180.7 LBS | OXYGEN SATURATION: 98 % | TEMPERATURE: 96.8 F | HEART RATE: 100 BPM | SYSTOLIC BLOOD PRESSURE: 140 MMHG

## 2024-07-10 DIAGNOSIS — E78.5 HYPERLIPIDEMIA, UNSPECIFIED HYPERLIPIDEMIA TYPE: ICD-10-CM

## 2024-07-10 DIAGNOSIS — Z12.5 PROSTATE CANCER SCREENING: ICD-10-CM

## 2024-07-10 DIAGNOSIS — I10 PRIMARY HYPERTENSION: Primary | ICD-10-CM

## 2024-07-10 DIAGNOSIS — R73.09 ABNORMAL GLUCOSE: ICD-10-CM

## 2024-07-10 PROCEDURE — 3077F SYST BP >= 140 MM HG: CPT | Performed by: STUDENT IN AN ORGANIZED HEALTH CARE EDUCATION/TRAINING PROGRAM

## 2024-07-10 PROCEDURE — 1159F MED LIST DOCD IN RCRD: CPT | Performed by: STUDENT IN AN ORGANIZED HEALTH CARE EDUCATION/TRAINING PROGRAM

## 2024-07-10 PROCEDURE — 99214 OFFICE O/P EST MOD 30 MIN: CPT | Performed by: STUDENT IN AN ORGANIZED HEALTH CARE EDUCATION/TRAINING PROGRAM

## 2024-07-10 PROCEDURE — 1160F RVW MEDS BY RX/DR IN RCRD: CPT | Performed by: STUDENT IN AN ORGANIZED HEALTH CARE EDUCATION/TRAINING PROGRAM

## 2024-07-10 PROCEDURE — 3078F DIAST BP <80 MM HG: CPT | Performed by: STUDENT IN AN ORGANIZED HEALTH CARE EDUCATION/TRAINING PROGRAM

## 2024-07-10 PROCEDURE — 1126F AMNT PAIN NOTED NONE PRSNT: CPT | Performed by: STUDENT IN AN ORGANIZED HEALTH CARE EDUCATION/TRAINING PROGRAM

## 2024-07-10 NOTE — PROGRESS NOTES
"Chief Complaint  Hypertension and Establish Care    Subjective        Elmer Groves presents to [facility]  History of Present Illness  Establish medical care  Pt stated his previous PCP was working on his Blood pressure.  He is checking his BP at home, systolic is coming in 120s and diastolic below 80s  Review of system is negative for fever, headache, chest pain, shortness of breath, palpitation, nausea, vomiting, any recent change in bladder habits.    Objective   Vital Signs:  /78 (BP Location: Left arm, Patient Position: Sitting, Cuff Size: Adult)   Pulse 100   Temp 96.8 °F (36 °C) (Temporal)   Resp 16   Ht 170.2 cm (67.01\")   Wt 82 kg (180 lb 11.2 oz)   SpO2 98%   BMI 28.29 kg/m²   Estimated body mass index is 28.29 kg/m² as calculated from the following:    Height as of this encounter: 170.2 cm (67.01\").    Weight as of this encounter: 82 kg (180 lb 11.2 oz).               Physical Exam  Cardiovascular:      Rate and Rhythm: Normal rate.      Pulses: Normal pulses.      Heart sounds: Normal heart sounds.   Pulmonary:      Effort: Pulmonary effort is normal.      Breath sounds: Normal breath sounds.   Abdominal:      General: Bowel sounds are normal.      Palpations: Abdomen is soft.   Musculoskeletal:         General: Normal range of motion.   Neurological:      Mental Status: He is alert and oriented to person, place, and time.        Result Review :    The following data was reviewed by: Kurt Gomez MD on 07/10/2024:  CMP          10/18/2023    10:45 6/13/2024    10:13   CMP   Glucose 115  115    BUN 15  8    Creatinine 0.85  0.77    Sodium 135  138    Potassium 5.2  4.6    Chloride 97  101    Calcium 9.9  9.9    Total Protein 7.5  7.2    Albumin 4.7  4.4    Globulin 2.8  2.8    Total Bilirubin 0.7  0.4    Alkaline Phosphatase 69  85    AST (SGOT) 25  29    ALT (SGPT) 29  24    BUN/Creatinine Ratio 18  10        Lipid Panel          10/18/2023    10:45 " 6/13/2024    10:13   Lipid Panel   Total Cholesterol 288  160    Triglycerides 67  87    HDL Cholesterol 75  66    VLDL Cholesterol 10  16    LDL Cholesterol  203  78    LDL/HDL Ratio  1.2                     Assessment and Plan     Diagnoses and all orders for this visit:    1. Primary hypertension (Primary)  -     CBC Auto Differential; Future  -     Comprehensive Metabolic Panel; Future  -     Lipid Panel With / Chol / HDL Ratio; Future  -     PSA SCREENING; Future    2. Hyperlipidemia, unspecified hyperlipidemia type  -     CBC Auto Differential; Future  -     Comprehensive Metabolic Panel; Future  -     Lipid Panel With / Chol / HDL Ratio; Future  -     PSA SCREENING; Future    3. Prostate cancer screening  -     PSA SCREENING; Future    4. Abnormal glucose  -     Hemoglobin A1c; Future    # labs reviewed   Glucose 115 Hemoglobin A1c has increased 5.7., recommend low carb diet and exercise as tolerated, more fruits and vegetables in diet  # HLD  LDL improved from 203 to 78  On statins, continue rosuvastatin 40 mg PO daily  # HTN  Well controlled at home, office readings on higher side but at home systolic in 120s as per pt  Continue amlodipine and metoprolol  RTC in 6 months for wellness check up.  Discussed about colonoscopy pt not decided yet           Follow Up     No follow-ups on file.  Patient was given instructions and counseling regarding his condition or for health maintenance advice. Please see specific information pulled into the AVS if appropriate.

## 2024-07-30 ENCOUNTER — APPOINTMENT (OUTPATIENT)
Dept: GENERAL RADIOLOGY | Facility: HOSPITAL | Age: 69
End: 2024-07-30
Payer: MEDICARE

## 2024-07-30 ENCOUNTER — APPOINTMENT (OUTPATIENT)
Dept: CT IMAGING | Facility: HOSPITAL | Age: 69
End: 2024-07-30
Payer: MEDICARE

## 2024-07-30 ENCOUNTER — HOSPITAL ENCOUNTER (EMERGENCY)
Facility: HOSPITAL | Age: 69
Discharge: HOME | End: 2024-07-30
Attending: EMERGENCY MEDICINE
Payer: MEDICARE

## 2024-07-30 VITALS
SYSTOLIC BLOOD PRESSURE: 140 MMHG | OXYGEN SATURATION: 98 % | TEMPERATURE: 97.2 F | DIASTOLIC BLOOD PRESSURE: 102 MMHG | HEIGHT: 67 IN | WEIGHT: 180.78 LBS | RESPIRATION RATE: 16 BRPM | BODY MASS INDEX: 28.37 KG/M2 | HEART RATE: 78 BPM

## 2024-07-30 DIAGNOSIS — R03.0 ELEVATED BLOOD PRESSURE READING: ICD-10-CM

## 2024-07-30 DIAGNOSIS — T67.5XXA HEAT EXHAUSTION, INITIAL ENCOUNTER: Primary | ICD-10-CM

## 2024-07-30 DIAGNOSIS — R00.0 SINUS TACHYCARDIA: ICD-10-CM

## 2024-07-30 DIAGNOSIS — Z86.79 HISTORY OF ATRIAL FIBRILLATION: ICD-10-CM

## 2024-07-30 LAB
ALBUMIN SERPL-MCNC: 4.9 G/DL (ref 3.5–5.2)
ALBUMIN/GLOB SERPL: 1.6 G/DL
ALP SERPL-CCNC: 80 U/L (ref 39–117)
ALT SERPL W P-5'-P-CCNC: 27 U/L (ref 1–41)
ANION GAP SERPL CALCULATED.3IONS-SCNC: 15.1 MMOL/L (ref 5–15)
AST SERPL-CCNC: 30 U/L (ref 1–40)
BASOPHILS # BLD AUTO: 0.04 10*3/MM3 (ref 0–0.2)
BASOPHILS NFR BLD AUTO: 0.4 % (ref 0–1.5)
BILIRUB SERPL-MCNC: 0.6 MG/DL (ref 0–1.2)
BILIRUB UR QL STRIP: NEGATIVE
BUN SERPL-MCNC: 10 MG/DL (ref 8–23)
BUN/CREAT SERPL: 13 (ref 7–25)
CALCIUM SPEC-SCNC: 9.6 MG/DL (ref 8.6–10.5)
CHLORIDE SERPL-SCNC: 99 MMOL/L (ref 98–107)
CLARITY UR: CLEAR
CO2 SERPL-SCNC: 22.9 MMOL/L (ref 22–29)
COLOR UR: YELLOW
CREAT SERPL-MCNC: 0.77 MG/DL (ref 0.76–1.27)
DEPRECATED RDW RBC AUTO: 42.3 FL (ref 37–54)
EGFRCR SERPLBLD CKD-EPI 2021: 98.1 ML/MIN/1.73
EOSINOPHIL # BLD AUTO: 0.02 10*3/MM3 (ref 0–0.4)
EOSINOPHIL NFR BLD AUTO: 0.2 % (ref 0.3–6.2)
ERYTHROCYTE [DISTWIDTH] IN BLOOD BY AUTOMATED COUNT: 12.2 % (ref 12.3–15.4)
GLOBULIN UR ELPH-MCNC: 3.1 GM/DL
GLUCOSE SERPL-MCNC: 134 MG/DL (ref 65–99)
GLUCOSE UR STRIP-MCNC: NEGATIVE MG/DL
HCT VFR BLD AUTO: 44.2 % (ref 37.5–51)
HGB BLD-MCNC: 14.7 G/DL (ref 13–17.7)
HGB UR QL STRIP.AUTO: NEGATIVE
HOLD SPECIMEN: NORMAL
HOLD SPECIMEN: NORMAL
IMM GRANULOCYTES # BLD AUTO: 0.04 10*3/MM3 (ref 0–0.05)
IMM GRANULOCYTES NFR BLD AUTO: 0.4 % (ref 0–0.5)
KETONES UR QL STRIP: NEGATIVE
LEUKOCYTE ESTERASE UR QL STRIP.AUTO: NEGATIVE
LYMPHOCYTES # BLD AUTO: 1.68 10*3/MM3 (ref 0.7–3.1)
LYMPHOCYTES NFR BLD AUTO: 18.3 % (ref 19.6–45.3)
MAGNESIUM SERPL-MCNC: 2.1 MG/DL (ref 1.6–2.4)
MCH RBC QN AUTO: 31.1 PG (ref 26.6–33)
MCHC RBC AUTO-ENTMCNC: 33.3 G/DL (ref 31.5–35.7)
MCV RBC AUTO: 93.4 FL (ref 79–97)
MONOCYTES # BLD AUTO: 0.68 10*3/MM3 (ref 0.1–0.9)
MONOCYTES NFR BLD AUTO: 7.4 % (ref 5–12)
NEUTROPHILS NFR BLD AUTO: 6.72 10*3/MM3 (ref 1.7–7)
NEUTROPHILS NFR BLD AUTO: 73.3 % (ref 42.7–76)
NITRITE UR QL STRIP: NEGATIVE
NRBC BLD AUTO-RTO: 0 /100 WBC (ref 0–0.2)
PH UR STRIP.AUTO: 7.5 [PH] (ref 5–8)
PLATELET # BLD AUTO: 253 10*3/MM3 (ref 140–450)
PMV BLD AUTO: 9.8 FL (ref 6–12)
POTASSIUM SERPL-SCNC: 3.8 MMOL/L (ref 3.5–5.2)
PROT SERPL-MCNC: 8 G/DL (ref 6–8.5)
PROT UR QL STRIP: NEGATIVE
RBC # BLD AUTO: 4.73 10*6/MM3 (ref 4.14–5.8)
SODIUM SERPL-SCNC: 137 MMOL/L (ref 136–145)
SP GR UR STRIP: <=1.005 (ref 1–1.03)
T4 FREE SERPL-MCNC: 1.18 NG/DL (ref 0.92–1.68)
TROPONIN T SERPL HS-MCNC: 11 NG/L
TSH SERPL DL<=0.05 MIU/L-ACNC: 0.99 UIU/ML (ref 0.27–4.2)
UROBILINOGEN UR QL STRIP: NORMAL
WBC NRBC COR # BLD AUTO: 9.18 10*3/MM3 (ref 3.4–10.8)
WHOLE BLOOD HOLD COAG: NORMAL
WHOLE BLOOD HOLD SPECIMEN: NORMAL

## 2024-07-30 PROCEDURE — 84484 ASSAY OF TROPONIN QUANT: CPT

## 2024-07-30 PROCEDURE — 71045 X-RAY EXAM CHEST 1 VIEW: CPT

## 2024-07-30 PROCEDURE — 93005 ELECTROCARDIOGRAM TRACING: CPT | Performed by: EMERGENCY MEDICINE

## 2024-07-30 PROCEDURE — 84439 ASSAY OF FREE THYROXINE: CPT | Performed by: EMERGENCY MEDICINE

## 2024-07-30 PROCEDURE — 70498 CT ANGIOGRAPHY NECK: CPT

## 2024-07-30 PROCEDURE — 85025 COMPLETE CBC W/AUTO DIFF WBC: CPT

## 2024-07-30 PROCEDURE — 25510000001 IOPAMIDOL PER 1 ML: Performed by: EMERGENCY MEDICINE

## 2024-07-30 PROCEDURE — 93005 ELECTROCARDIOGRAM TRACING: CPT

## 2024-07-30 PROCEDURE — 83735 ASSAY OF MAGNESIUM: CPT

## 2024-07-30 PROCEDURE — 99285 EMERGENCY DEPT VISIT HI MDM: CPT

## 2024-07-30 PROCEDURE — 81003 URINALYSIS AUTO W/O SCOPE: CPT | Performed by: EMERGENCY MEDICINE

## 2024-07-30 PROCEDURE — 70496 CT ANGIOGRAPHY HEAD: CPT

## 2024-07-30 PROCEDURE — 84443 ASSAY THYROID STIM HORMONE: CPT | Performed by: EMERGENCY MEDICINE

## 2024-07-30 PROCEDURE — 80053 COMPREHEN METABOLIC PANEL: CPT

## 2024-07-30 PROCEDURE — 25810000003 SODIUM CHLORIDE 0.9 % SOLUTION: Performed by: EMERGENCY MEDICINE

## 2024-07-30 PROCEDURE — 93010 ELECTROCARDIOGRAM REPORT: CPT | Performed by: INTERNAL MEDICINE

## 2024-07-30 RX ORDER — SODIUM CHLORIDE 0.9 % (FLUSH) 0.9 %
10 SYRINGE (ML) INJECTION AS NEEDED
Status: DISCONTINUED | OUTPATIENT
Start: 2024-07-30 | End: 2024-07-30 | Stop reason: HOSPADM

## 2024-07-30 RX ORDER — METOPROLOL SUCCINATE 50 MG/1
50 TABLET, EXTENDED RELEASE ORAL DAILY
Qty: 30 TABLET | Refills: 0 | Status: SHIPPED | OUTPATIENT
Start: 2024-07-30

## 2024-07-30 RX ADMIN — IOPAMIDOL 85 ML: 755 INJECTION, SOLUTION INTRAVENOUS at 15:50

## 2024-07-30 RX ADMIN — SODIUM CHLORIDE 1000 ML: 9 INJECTION, SOLUTION INTRAVENOUS at 15:42

## 2024-07-30 NOTE — ED PROVIDER NOTES
EMERGENCY DEPARTMENT ENCOUNTER    Room Number:  17/17  Date seen:  7/30/2024  PCP: Kurt Gomez MD  Historian: Patient      HPI:  Chief Complaint: Dizziness  Context: Elmer Groves is a 68 y.o. male who presents to the ED c/o dizziness since yesterday.  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  The patient states he got up this morning and felt better and went to work this morning and then went home for lunch and while driving back to work and felt lightheaded again.  The patient denies room spinning, visual trouble, nausea, vomiting, focal weakness, trouble walking, trouble talking.      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       PAST SURGICAL HISTORY  Past Surgical History:   Procedure Laterality Date    ANAL FISTULOTOMY  02/08/2016    CARDIAC CATHETERIZATION      COLONOSCOPY  02/08/2016    POLYP    OTHER SURGICAL HISTORY      catheter ablation atrial fibrillation 2x    OTHER SURGICAL HISTORY  02/08/2016    INTERNAL HEMORRHOID LIGATION     SHOULDER SURGERY Left 1996    fell off roof, had shoulder reconstruction         FAMILY HISTORY  Family History   Problem Relation Age of Onset    Hyperlipidemia Mother     Coronary artery disease Father         arteriosclerotic cardiovascular disease, cerebral infarction    Hypertension Father     Stroke Father         cerebral infarction    COPD Other          SOCIAL HISTORY  Social History     Socioeconomic History    Marital status:     Number of children: 3    Years of education: 12   Tobacco Use    Smoking status: Never    Smokeless tobacco: Never   Vaping Use    Vaping status: Never Used   Substance and Sexual Activity    Alcohol use: Yes     Comment: 3 daily, past heavier use    Drug use: No    Sexual activity: Yes     Partners: Female     Birth control/protection: None         ALLERGIES  Patient has no known allergies.      PHYSICAL EXAM  ED Triage Vitals   Temp Heart Rate Resp BP SpO2   07/30/24 1311 07/30/24 1311 07/30/24 1311 07/30/24 1313 07/30/24 1311   97.2 °F (36.2 °C) (!) 132 18 (!) 165/105 97 %      Temp src Heart Rate Source Patient Position BP Location FiO2 (%)   -- -- -- -- --              Physical Exam      GENERAL: 68-year male in no acute distress  HENT: NCAT: nares patent: Neck supple  EYES: no scleral icterus  PERRLA: EOMI: No nystagmus  CV: regular rhythm, tachycardic to 110 with extrasystoles  RESPIRATORY: normal effort  ABDOMEN: soft, NTND: Bowel sounds positive  MUSCULOSKELETAL: no deformity  NEURO: alert and oriented x 3 with a normal neuroexam including normal finger-nose and heel-to-shin: NIHSS of 0  PSYCH:  calm, cooperative  SKIN: warm, dry    Vital signs and nursing notes reviewed.      LAB RESULTS  Recent Results (from the past 24 hour(s))   ECG 12 Lead ED Triage Standing Order; Weak / Dizzy / AMS    Collection Time: 07/30/24  1:16 PM   Result Value Ref Range    QT Interval 335 ms    QTC Interval 464 ms   Comprehensive Metabolic Panel    Collection Time: 07/30/24  1:21 PM     Specimen: Blood   Result Value Ref Range    Glucose 134 (H) 65 - 99 mg/dL    BUN 10 8 - 23 mg/dL    Creatinine 0.77 0.76 - 1.27 mg/dL    Sodium 137 136 - 145 mmol/L    Potassium 3.8 3.5 - 5.2 mmol/L    Chloride 99 98 - 107 mmol/L    CO2 22.9 22.0 - 29.0 mmol/L    Calcium 9.6 8.6 - 10.5 mg/dL    Total Protein 8.0 6.0 - 8.5 g/dL    Albumin 4.9 3.5 - 5.2 g/dL    ALT (SGPT) 27 1 - 41 U/L    AST (SGOT) 30 1 - 40 U/L    Alkaline Phosphatase 80 39 - 117 U/L    Total Bilirubin 0.6 0.0 - 1.2 mg/dL    Globulin 3.1 gm/dL    A/G Ratio 1.6 g/dL    BUN/Creatinine Ratio 13.0 7.0 - 25.0    Anion Gap 15.1 (H) 5.0 - 15.0 mmol/L    eGFR 98.1 >60.0 mL/min/1.73   Single High Sensitivity Troponin T    Collection Time: 07/30/24  1:21 PM    Specimen: Blood   Result Value Ref Range    HS Troponin T 11 <22 ng/L   Magnesium    Collection Time: 07/30/24  1:21 PM    Specimen: Blood   Result Value Ref Range    Magnesium 2.1 1.6 - 2.4 mg/dL   Green Top (Gel)    Collection Time: 07/30/24  1:21 PM   Result Value Ref Range    Extra Tube Hold for add-ons.    Lavender Top    Collection Time: 07/30/24  1:21 PM   Result Value Ref Range    Extra Tube hold for add-on    Gold Top - SST    Collection Time: 07/30/24  1:21 PM   Result Value Ref Range    Extra Tube Hold for add-ons.    Light Blue Top    Collection Time: 07/30/24  1:21 PM   Result Value Ref Range    Extra Tube Hold for add-ons.    CBC Auto Differential    Collection Time: 07/30/24  1:21 PM    Specimen: Blood   Result Value Ref Range    WBC 9.18 3.40 - 10.80 10*3/mm3    RBC 4.73 4.14 - 5.80 10*6/mm3    Hemoglobin 14.7 13.0 - 17.7 g/dL    Hematocrit 44.2 37.5 - 51.0 %    MCV 93.4 79.0 - 97.0 fL    MCH 31.1 26.6 - 33.0 pg    MCHC 33.3 31.5 - 35.7 g/dL    RDW 12.2 (L) 12.3 - 15.4 %    RDW-SD 42.3 37.0 - 54.0 fl    MPV 9.8 6.0 - 12.0 fL    Platelets 253 140 - 450 10*3/mm3    Neutrophil % 73.3 42.7 - 76.0 %    Lymphocyte % 18.3 (L) 19.6 - 45.3 %    Monocyte % 7.4 5.0 - 12.0 %    Eosinophil % 0.2  (L) 0.3 - 6.2 %    Basophil % 0.4 0.0 - 1.5 %    Immature Grans % 0.4 0.0 - 0.5 %    Neutrophils, Absolute 6.72 1.70 - 7.00 10*3/mm3    Lymphocytes, Absolute 1.68 0.70 - 3.10 10*3/mm3    Monocytes, Absolute 0.68 0.10 - 0.90 10*3/mm3    Eosinophils, Absolute 0.02 0.00 - 0.40 10*3/mm3    Basophils, Absolute 0.04 0.00 - 0.20 10*3/mm3    Immature Grans, Absolute 0.04 0.00 - 0.05 10*3/mm3    nRBC 0.0 0.0 - 0.2 /100 WBC   TSH    Collection Time: 07/30/24  1:21 PM    Specimen: Blood   Result Value Ref Range    TSH 0.992 0.270 - 4.200 uIU/mL   T4, Free    Collection Time: 07/30/24  1:21 PM    Specimen: Blood   Result Value Ref Range    Free T4 1.18 0.92 - 1.68 ng/dL   Urinalysis With Microscopic If Indicated (No Culture) - Urine, Clean Catch    Collection Time: 07/30/24  3:05 PM    Specimen: Urine, Clean Catch   Result Value Ref Range    Color, UA Yellow Yellow, Straw    Appearance, UA Clear Clear    pH, UA 7.5 5.0 - 8.0    Specific Gravity, UA <=1.005 1.005 - 1.030    Glucose, UA Negative Negative    Ketones, UA Negative Negative    Bilirubin, UA Negative Negative    Blood, UA Negative Negative    Protein, UA Negative Negative    Leuk Esterase, UA Negative Negative    Nitrite, UA Negative Negative    Urobilinogen, UA 0.2 E.U./dL 0.2 - 1.0 E.U./dL       Ordered the above labs and reviewed the results.        RADIOLOGY  CT Angiogram Head, CT Angiogram Neck    Result Date: 7/30/2024  CT ANGIOGRAM OF THE HEAD AND NECK WITH CONTRAST  CLINICAL HISTORY: Dizziness.  TECHNIQUE: CT angiogram of the head and neck was obtained with 1 mm axial images following the administration of IV contrast. Sagittal, coronal, and 3-dimensional reconstructed images were obtained. Additionally, a CT scan of the head was obtained with 3 mm axial images before and after the administration of IV contrast.  COMPARISON: None.  FINDINGS:  CTA NECK: There is a bovine configuration of the aortic arch. There is no significant great vessel origin stenosis. The  distal aspect of the left subclavian artery and the left axillary artery are poorly visualized due to prominent beam hardening and streak artifact from adjacent dense venous opacification. Pathology within this portion of the left subclavian artery and the left axillary artery cannot be excluded on the basis of this exam. The vertebral arteries are unremarkable in appearance. Atherosclerotic changes are noted within the carotid bifurcations and proximal internal carotids. However, no significant NASCET stenosis is appreciated on either side.  CTA HEAD: The vertebral arteries, basilar artery, and posterior cerebral arteries are unremarkable. Mild atherosclerotic changes are noted within the carotid siphons not producing any significant degree of luminal compromise. The middle and anterior cerebral arteries are within normal limits.  The ventricles, sulci, and cisterns are age-appropriate. The gray-white matter differentiation is within normal limits. The posterior fossa structures are unremarkable. The basal ganglia and thalami are also unremarkable.  A metallic density is seen within the left anterior frontal scalp that measures up to approximately 5 mm in diameter and is suspicious for a metallic foreign body. Please correlate with historical data.       There is no significant great vessel origin stenosis. The distal aspect of the left subclavian artery and the left axillary artery are poorly visualized due to adjacent dense venous opacification resulting in prominent beam hardening and streak artifact.  Atherosclerotic changes are identified within both proximal internal carotid arteries. However, no significant NASCET stenosis is identified on either side.  Mild atherosclerotic changes are identified within the carotid siphons as discussed above.  5 mm metallic density within the anterior left frontal scalp which is suspicious for a metallic foreign body. Please correlate with historical data. This could  "potentially be a contraindication for MR imaging.  Please take note that this is a preliminary result until the receipt of the reconstructed images from 3DR at which time this report will be finalized.   Radiation dose reduction techniques were utilized, including automated exposure control and exposure modulation based on body size.      XR Chest 1 View    Result Date: 7/30/2024  XR CHEST 1 VW-7/30/2024  HISTORY: Weakness. Dizziness.  Heart size is at the upper limits of normal. Lungs appear free of acute infiltrates. There are minor degenerative changes in the spine.      1. No acute process.   This report was finalized on 7/30/2024 2:26 PM by Dr. Loi Reyes M.D on Workstation: [id number]       Ordered the above noted radiological studies. Reviewed by me in PACS.            PROCEDURES  Procedures          MEDICATIONS GIVEN IN ER  Medications   sodium chloride 0.9 % flush 10 mL (has no administration in time range)   sodium chloride 0.9 % bolus 1,000 mL (1,000 mL Intravenous New Bag 7/30/24 1542)   iopamidol (ISOVUE-370) 76 % injection 100 mL (85 mL Intravenous Given by Other 7/30/24 1550)             MEDICAL DECISION MAKING, PROGRESS, and CONSULTS    All labs have been independently reviewed by me.  All radiology studies have been reviewed by me and I have also reviewed the radiology report.   EKG's independently viewed and interpreted by me.  Discussion below represents my analysis of pertinent findings related to patient's condition, differential diagnosis, treatment plan and final disposition.      Additional sources:  - Discussed/ obtained information from independent historians: Patient's wife is here and she states \"I am his boss\" he has been outside working in the heat excessively    - External (non-ED) record review: I reviewed the patient's medical record states he is on Toprol-XL 25 mg    - Chronic or social conditions impacting care: Patient lives at home with wife    - Shared decision making: " After shared decision-making discussion we agreed he is stable for discharge and outpatient follow-up      Orders placed during this visit:  Orders Placed This Encounter   Procedures    XR Chest 1 View    CT Angiogram Head    CT Angiogram Neck    [place] Draw    Comprehensive Metabolic Panel    Single High Sensitivity Troponin T    Magnesium    Urinalysis With Microscopic If Indicated (No Culture) - Urine, Clean Catch    CBC Auto Differential    TSH    T4, Free    NPO Diet NPO Type: Strict NPO    Undress & Gown    Continuous Pulse Oximetry    Vital Signs    Orthostatic Vitals    Oxygen Therapy- Nasal Cannula; Titrate 1-6 LPM Per SpO2; 90 - 95%    ECG 12 Lead ED Triage Standing Order; Weak / Dizzy / AMS    Insert Peripheral IV    Fall Precautions    CBC & Differential    Green Top (Gel)    Lavender Top    Gold Top - SST    Light Blue Top         Differential diagnosis:  My differential diagnosis includes but is not limited to heat exhaustion, stroke, orthostatic hypotension, dehydration or electrolyte abnormality      Independent interpretation of labs, radiology studies, and discussions with consultants:  ED Course as of 07/30/24 1742 Tue Jul 30, 2024   1451 I will treat the patient with IV fluids will obtain an EKG, labs, urinalysis and head CT for further evaluation. [GP]   1452 EKG    EKG time: 1316  Rhythm/Rate: Sinus tachycardia 115 with PVCs  No Acute Ischemia  Non-Specific ST-T changes    Similar compared to prior on 1/26/2016 except now the rate is faster and the ectopy is new    Interpreted Contemporaneously by me.  Independently viewed by me     [GP]   1706 Patient CTA head and neck were negative acute [GP]   1706 Patient's troponin, CBC, chemistries, urinalysis and labs are unremarkable [GP]   1706 The patient is not orthostatic [GP]   1738 On repeat examination the patient's blood pressure is 140/100 and his heart rate is in the 90s.  I believe he likely had some heat related illness.  However the  patient has been taking his 25 mg of Toprol XL and he states over the past few weeks both his heart rate and blood pressure have began to creep up.  I will advise the patient stay inside for the next 24 hours and hydrate we will also increase him to 50 mg of Toprol.  The patient will check his heart rate and blood pressure daily and record and follow-up with his PCP as an outpatient. [GP]      ED Course User Index  [GP] Karan Higgins MD               DIAGNOSIS  Final diagnoses:   Heat exhaustion, initial encounter   Sinus tachycardia   Elevated blood pressure reading         DISPOSITION  DISCHARGE    Patient discharged in stable condition.    Reviewed implications of results, diagnosis, meds, responsibility to follow up, warning signs and symptoms of possible worsening, potential complications and reasons to return to ER, including worsening symptoms, focal neurodeficit or passing out.    Patient/Family voiced understanding of above instructions.    Discussed plan for discharge, as there is no emergent indication for admission.  Pt/family is agreeable and understands need for follow up and repeat testing.  Pt is aware that discharge does not mean that nothing is wrong but it indicates no emergency is present and they must continue care with follow-up as given below or physician of their choice.     FOLLOW-UP  Kurt Gomez MD  [id number] [facility] [id number]  [phone]    Schedule an appointment as soon as possible for a visit in 1 week  For recheck                Latest Documented Vital Signs:  As of 17:42 EDT  BP- (!) 140/102 HR- 108 Temp- 97.2 °F (36.2 °C) O2 sat- 97%--      --------------------  Please note that portions of this were completed with a voice recognition program.       Note Disclaimer: At [facility], we believe that sharing information builds trust and better relationships. You are receiving this note because you are receiving care at [facility] or recently visited. It is  possible you will see health information before a provider has talked with you about it. This kind of information can be easy to misunderstand. To help you fully understand what it means for your health, we urge you to discuss this note with your provider.             Karan Higgins MD  07/30/24 [id number]

## 2024-07-30 NOTE — DISCHARGE INSTRUCTIONS
Go home and rest inside for the next 24 hours.  Push fluids.  Increase your Toprol-XL to 50 mg/day.  Record your heart rate and blood pressure daily and follow-up with your doctor next week.  Return if worse.

## 2024-07-31 LAB
QT INTERVAL: 335 MS
QTC INTERVAL: 464 MS

## 2024-08-07 ENCOUNTER — OFFICE VISIT (OUTPATIENT)
Dept: FAMILY MEDICINE CLINIC | Facility: CLINIC | Age: 69
End: 2024-08-07
Payer: MEDICARE

## 2024-08-07 VITALS
DIASTOLIC BLOOD PRESSURE: 74 MMHG | OXYGEN SATURATION: 97 % | BODY MASS INDEX: 27.84 KG/M2 | TEMPERATURE: 98.9 F | RESPIRATION RATE: 16 BRPM | HEIGHT: 67 IN | WEIGHT: 177.4 LBS | SYSTOLIC BLOOD PRESSURE: 160 MMHG | HEART RATE: 93 BPM

## 2024-08-07 DIAGNOSIS — I10 PRIMARY HYPERTENSION: Primary | ICD-10-CM

## 2024-08-07 PROCEDURE — 1159F MED LIST DOCD IN RCRD: CPT | Performed by: STUDENT IN AN ORGANIZED HEALTH CARE EDUCATION/TRAINING PROGRAM

## 2024-08-07 PROCEDURE — 3078F DIAST BP <80 MM HG: CPT | Performed by: STUDENT IN AN ORGANIZED HEALTH CARE EDUCATION/TRAINING PROGRAM

## 2024-08-07 PROCEDURE — 1126F AMNT PAIN NOTED NONE PRSNT: CPT | Performed by: STUDENT IN AN ORGANIZED HEALTH CARE EDUCATION/TRAINING PROGRAM

## 2024-08-07 PROCEDURE — 1160F RVW MEDS BY RX/DR IN RCRD: CPT | Performed by: STUDENT IN AN ORGANIZED HEALTH CARE EDUCATION/TRAINING PROGRAM

## 2024-08-07 PROCEDURE — 3077F SYST BP >= 140 MM HG: CPT | Performed by: STUDENT IN AN ORGANIZED HEALTH CARE EDUCATION/TRAINING PROGRAM

## 2024-08-07 PROCEDURE — 99214 OFFICE O/P EST MOD 30 MIN: CPT | Performed by: STUDENT IN AN ORGANIZED HEALTH CARE EDUCATION/TRAINING PROGRAM

## 2024-08-07 RX ORDER — LOSARTAN POTASSIUM 25 MG/1
25 TABLET ORAL DAILY
Qty: 60 TABLET | Refills: 0 | Status: SHIPPED | OUTPATIENT
Start: 2024-08-07

## 2024-08-07 RX ORDER — METOPROLOL SUCCINATE 50 MG/1
50 TABLET, EXTENDED RELEASE ORAL DAILY
Qty: 90 TABLET | Refills: 0 | Status: SHIPPED | OUTPATIENT
Start: 2024-08-07

## 2024-08-15 NOTE — PROGRESS NOTES
"Chief Complaint  Heat Exposure (7/30/2024 (3 hours)/[facility] EMERGENCY DEPARTMENT./Final diagnoses:/Heat exhaustion, initial encounter/Sinus tachycardia/Elevated blood pressure reading//)    Subjective        Elmer Groves presents to [facility]  Heat Exposure      For follow-up on hypertension and also his recent ER visit  Patient went to ER on July 30, 2024 with chief complaint of dizziness for 1 day and patient was diagnosed with heat exhaustion, sinus tachycardia and uncontrolled hypertension and his metoprolol dose was increased to 50 mg because of high heart rate and he was advised to follow-up with me within a week or 2  Objective   Vital Signs:  /74 (BP Location: Left arm, Patient Position: Sitting, Cuff Size: Adult)   Pulse 93   Temp 98.9 °F (37.2 °C) (Oral)   Resp 16   Ht 170.2 cm (67\")   Wt 80.5 kg (177 lb 6.4 oz)   SpO2 97%   BMI 27.78 kg/m²   Estimated body mass index is 27.78 kg/m² as calculated from the following:    Height as of this encounter: 170.2 cm (67\").    Weight as of this encounter: 80.5 kg (177 lb 6.4 oz).            Physical Exam  Cardiovascular:      Rate and Rhythm: Normal rate.      Pulses: Normal pulses.      Heart sounds: Normal heart sounds.   Pulmonary:      Effort: Pulmonary effort is normal.      Breath sounds: Normal breath sounds.   Abdominal:      General: Bowel sounds are normal.      Palpations: Abdomen is soft.   Skin:     General: Skin is warm.   Neurological:      Mental Status: He is alert and oriented to person, place, and time.        Result Review :  The following data was reviewed by: Kurt Gomez MD on 08/07/2024:  CMP          10/18/2023    10:45 6/13/2024    10:13 7/30/2024    13:21   CMP   Glucose 115  115  134    BUN 15  8  10    Creatinine 0.85  0.77  0.77    EGFR   98.1    Sodium 135  138  137    Potassium 5.2  4.6  3.8    Chloride 97  101  99    Calcium 9.9  9.9  9.6    Total Protein 7.5  7.2   "   Total Protein   8.0    Albumin 4.7  4.4  4.9    Globulin 2.8  2.8     Globulin   3.1    Total Bilirubin 0.7  0.4  0.6    Alkaline Phosphatase 69  85  80    AST (SGOT) 25  29  30    ALT (SGPT) 29  24  27    Albumin/Globulin Ratio   1.6    BUN/Creatinine Ratio 18  10  13.0    Anion Gap   15.1      CBC          7/30/2024    13:21   CBC   WBC 9.18    RBC 4.73    Hemoglobin 14.7    Hematocrit 44.2    MCV 93.4    MCH 31.1    MCHC 33.3    RDW 12.2    Platelets 253      Lipid Panel          10/18/2023    10:45 6/13/2024    10:13   Lipid Panel   Total Cholesterol 288  160    Triglycerides 67  87    HDL Cholesterol 75  66    VLDL Cholesterol 10  16    LDL Cholesterol  203  78    LDL/HDL Ratio  1.2      TSH          7/30/2024    13:21   TSH   TSH 0.992                Assessment and Plan   Diagnoses and all orders for this visit:    1. Primary hypertension (Primary)  -     losartan (Cozaar) 25 MG tablet; Take 1 tablet by mouth Daily.  Dispense: 60 tablet; Refill: 0  -     metoprolol succinate XL (TOPROL-XL) 50 MG 24 hr tablet; Take 1 tablet by mouth Daily.  Dispense: 90 tablet; Refill: 0    # Hypertension  Patient blood pressure is around 160/74  Heart rate is around 93  Currently patient is taking metoprolol 50 mg p.o. daily, continue same  2 since blood pressure is on the higher side patient is started on the new medication losartan 25 mg p.o. daily  Continue amlodipine 10 mg p.o. daily  RTC in a month for recheck up         Follow Up   No follow-ups on file.  Patient was given instructions and counseling regarding his condition or for health maintenance advice. Please see specific information pulled into the AVS if appropriate.

## 2024-09-09 ENCOUNTER — OFFICE VISIT (OUTPATIENT)
Dept: FAMILY MEDICINE CLINIC | Facility: CLINIC | Age: 69
End: 2024-09-09
Payer: MEDICARE

## 2024-09-09 VITALS
DIASTOLIC BLOOD PRESSURE: 78 MMHG | HEIGHT: 67 IN | WEIGHT: 179.2 LBS | TEMPERATURE: 97.9 F | SYSTOLIC BLOOD PRESSURE: 182 MMHG | HEART RATE: 89 BPM | BODY MASS INDEX: 28.12 KG/M2 | RESPIRATION RATE: 16 BRPM | OXYGEN SATURATION: 98 %

## 2024-09-09 DIAGNOSIS — I10 PRIMARY HYPERTENSION: Primary | ICD-10-CM

## 2024-09-09 PROCEDURE — 3077F SYST BP >= 140 MM HG: CPT | Performed by: STUDENT IN AN ORGANIZED HEALTH CARE EDUCATION/TRAINING PROGRAM

## 2024-09-09 PROCEDURE — 99213 OFFICE O/P EST LOW 20 MIN: CPT | Performed by: STUDENT IN AN ORGANIZED HEALTH CARE EDUCATION/TRAINING PROGRAM

## 2024-09-09 PROCEDURE — 1160F RVW MEDS BY RX/DR IN RCRD: CPT | Performed by: STUDENT IN AN ORGANIZED HEALTH CARE EDUCATION/TRAINING PROGRAM

## 2024-09-09 PROCEDURE — 1159F MED LIST DOCD IN RCRD: CPT | Performed by: STUDENT IN AN ORGANIZED HEALTH CARE EDUCATION/TRAINING PROGRAM

## 2024-09-09 PROCEDURE — 3078F DIAST BP <80 MM HG: CPT | Performed by: STUDENT IN AN ORGANIZED HEALTH CARE EDUCATION/TRAINING PROGRAM

## 2024-09-09 PROCEDURE — 1126F AMNT PAIN NOTED NONE PRSNT: CPT | Performed by: STUDENT IN AN ORGANIZED HEALTH CARE EDUCATION/TRAINING PROGRAM

## 2024-09-09 NOTE — PROGRESS NOTES
"Chief Complaint  Hypertension    Subjective        Elmer Groves presents to [facility] PRIMARY [facility]  Hypertension      Hypertension  In the process of adjusting medication to bring blood pressure back to goal.  In last visit patient advised to continue metoprolol 50 mg daily, amlodipine 10 mg daily and new medication losartan 25 mg daily was started  Patient tolerated medication well without having any side effects.  Stated compliance with the medication  No other issues  Objective   Vital Signs:  BP (!) 182/78 (BP Location: Left arm, Patient Position: Sitting, Cuff Size: Adult)   Pulse 89   Temp 97.9 °F (36.6 °C) (Oral)   Resp 16   Ht 170.2 cm (67\")   Wt 81.3 kg (179 lb 3.2 oz)   SpO2 98%   BMI 28.07 kg/m²   Estimated body mass index is 28.07 kg/m² as calculated from the following:    Height as of this encounter: 170.2 cm (67\").    Weight as of this encounter: 81.3 kg (179 lb 3.2 oz).            Physical Exam  Cardiovascular:      Rate and Rhythm: Normal rate.      Pulses: Normal pulses.      Heart sounds: Normal heart sounds.   Pulmonary:      Effort: Pulmonary effort is normal.      Breath sounds: Normal breath sounds.   Abdominal:      Palpations: Abdomen is soft.   Musculoskeletal:      Right lower leg: No edema.      Left lower leg: No edema.   Neurological:      Mental Status: He is alert.        Result Review :                Assessment and Plan   Diagnoses and all orders for this visit:    1. Primary hypertension (Primary)    # Hypertension  In office patient blood pressure rechecked again 158/60  Patient brought a week readings from home, readings are as below  1-36/82, 135/73, 125/80, 125/75, 137/84, 134/78  Based on home readings patient is advised to continue with metoprolol 50 mg p.o. daily, losartan 25 mg p.o. daily, amlodipine 10 mg p.o. daily  Patient has appointment for wellness checkup in December, advised to bring blood pressure machine to the office to compare the " readings  RTC in the next scheduled visit with labs         Follow Up   No follow-ups on file.  Patient was given instructions and counseling regarding his condition or for health maintenance advice. Please see specific information pulled into the AVS if appropriate.

## 2024-09-12 NOTE — PROGRESS NOTES
"      ELECTROPHYSIOLOGY   Date of Office Visit: 2024  Patient Name: Elmer Groves  : 1955  Encounter Provider: Alfred Rod PA-C  Electrophysiologist: Dr. David  CHIEF COMPLAINT / REASON FOR OFFICE VISIT     Atrial Fibrillation  1 year follow up    HISTORY OF PRESENT ILLNESS     This is a 69 y.o. year old male who presents to [facility] CARDIOLOGY for a 1 year follow up of cardiovascular health.     He has a history of persistent atrial fibrillation with prior PVI in . He has not had any AF since then.     Today the patient reports he has been doing quite well over the past few months.  He had 1 episode in July when he was out working on a deck and was very dehydrated where he got some palpitations and felt dizzy.  By the time he went to the ER he was seen to be in sinus tach with having some PVCs.  His blood pressure was also extremely high and his losartan was increased to 50 mg daily.    He has been working with his new primary care provider and getting his blood pressure control improved.  It seems to be well-controlled the last few months at home after that change into the 130s over 80s.     His energy levels are similar compared to last year.  He denies any episodes of dizziness, lightheadedness, chest pain, sustained palpitations, orthopnea or lower extremity edema.    His father has a history of an MI at age 78.    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    PMHx:  HTN, Persistent AF s/p PVI in , obesity, HL    PHYSICAL EXAMINATION     Vital Signs:  /80   Pulse 82   Ht 170.2 cm (67\")   Wt 80.7 kg (178 lb)   BMI 27.88 kg/m²   Estimated body mass index is 27.88 kg/m² as calculated from the following:    Height as of this encounter: 170.2 cm (67\").    Weight as of this encounter: 80.7 kg (178 lb).               Physical Exam  Constitutional:       Appearance: Normal appearance.   HENT:      Head: Normocephalic " "and atraumatic.   Cardiovascular:      Rate and Rhythm: Normal rate and regular rhythm.      Pulses: Normal pulses.      Heart sounds: Normal heart sounds.   Pulmonary:      Effort: Pulmonary effort is normal.      Breath sounds: Normal breath sounds.   Musculoskeletal:      Right lower leg: No edema.      Left lower leg: No edema.   Skin:     General: Skin is warm and dry.   Neurological:      General: No focal deficit present.      Mental Status: He is alert and oriented to person, place, and time.          Cardiac Testing/Results     Result Review :  The following data was reviewed by: Alfred Rdo PA-C on 09/13/2024:    Lipid Panel          10/18/2023    10:45 6/13/2024    10:13   Lipid Panel   Total Cholesterol 288  160    Triglycerides 67  87    HDL Cholesterol 75  66    VLDL Cholesterol 10  16    LDL Cholesterol  203  78    LDL/HDL Ratio  1.2       Lab Results   Component Value Date     07/30/2024     06/13/2024    K 3.8 07/30/2024    K 4.6 06/13/2024    CL 99 07/30/2024     06/13/2024    CO2 22.9 07/30/2024    CO2 23 06/13/2024    BUN 10 07/30/2024    BUN 8 06/13/2024    CREATININE 0.77 07/30/2024    CREATININE 0.77 06/13/2024    EGFRIFNONA 98 03/18/2016    EGFRIFNONA 89 01/27/2016    EGFRIFAFRI 118 03/18/2016    EGFRIFAFRI 107 01/27/2016    GLUCOSE 134 (H) 07/30/2024    GLUCOSE 115 (H) 06/13/2024    CALCIUM 9.6 07/30/2024    CALCIUM 9.9 06/13/2024    ALBUMIN 4.9 07/30/2024    ALBUMIN 4.4 06/13/2024    AST 30 07/30/2024    AST 29 06/13/2024    ALT 27 07/30/2024    ALT 24 06/13/2024     Lab Results   Component Value Date    WBC 9.18 07/30/2024    WBC 10.39 01/06/2016    HGB 14.7 07/30/2024    HGB 14.0 01/06/2016    HCT 44.2 07/30/2024    HCT 42.2 01/06/2016    MCV 93.4 07/30/2024    MCV 95.5 01/06/2016     07/30/2024     01/06/2016     No results found for: \"PROBNP\", \"BNP\"  Lab Results   Component Value Date    TROPONINT 11 07/30/2024     Lab Results   Component Value " Date    TSH 0.992 07/30/2024    TSH 1.11 01/27/2016                 ECG 12 Lead    Date/Time: 9/13/2024 8:32 AM  Performed by: Alfred Dubois PA-C    Authorized by: Alfred Dubois PA-C  Comparison: compared with previous ECG from 7/30/2023  Rhythm: sinus rhythm  Rate: normal  BPM: 82  ST Segments: ST segments normal  T Waves: T waves normal  QRS axis: left    Clinical impression: normal ECG  Comments: Qtc 451                ASSESSMENT & PLAN       Diagnoses and all orders for this visit:    1. History of atrial fibrillation (Primary)  He has not had any episodes of atrial fibrillation in the last year.  He had a PVI in 2013 and has not had recurrence of A-fib since  Not on anticoagulation due to no recurrence of A-fib  Continue Toprol 50 mg daily  EKG today stable with no new T wave changes  2. Primary hypertension  He does have whitecoat hypertension.  Blood pressure at home has been well-controlled after his losartan has been increased to 50 mg daily.  He has been working with his PCP Dr. Gomez          Follow Up:  Return in about 1 year (around 9/13/2025) for Dr. Harsh Gale.  Patient was given instructions and counseling regarding his condition or for health maintenance advice. Please contact office if worsening symptoms or proceed to ER when appropriate.      Alfred Dubois PA-C  09/13/24  08:46 EDT    MEDICATIONS         Discharge Medications            Accurate as of September 13, 2024  8:46 AM. If you have any questions, ask your nurse or doctor.                Changes to Medications        Instructions Start Date   losartan 50 MG tablet  Commonly known as: Cozaar  What changed:   medication strength  how much to take  Changed by: Alfred Dubois   50 mg, Oral, Daily             Continue These Medications        Instructions Start Date   amLODIPine 10 MG tablet  Commonly known as: NORVASC   10 mg, Oral, Daily      metoprolol succinate XL 50 MG 24 hr tablet  Commonly known as:  TOPROL-XL   50 mg, Oral, Daily      rosuvastatin 40 MG tablet  Commonly known as: CRESTOR   40 mg, Oral, Nightly                   **Avinash Disclaimer: This note was dictated using an electronic transcription. The electronic translation of spoken language may permit erroneous, or at times, nonsensical words or phrases to be inadvertently transcribed. Although I have reviewed the note for such errors, some may still exist.

## 2024-09-13 ENCOUNTER — OFFICE VISIT (OUTPATIENT)
Age: 69
End: 2024-09-13
Payer: MEDICARE

## 2024-09-13 VITALS
BODY MASS INDEX: 27.94 KG/M2 | HEIGHT: 67 IN | HEART RATE: 82 BPM | SYSTOLIC BLOOD PRESSURE: 142 MMHG | WEIGHT: 178 LBS | DIASTOLIC BLOOD PRESSURE: 80 MMHG

## 2024-09-13 DIAGNOSIS — I10 PRIMARY HYPERTENSION: ICD-10-CM

## 2024-09-13 DIAGNOSIS — Z86.79 HISTORY OF ATRIAL FIBRILLATION: Primary | ICD-10-CM

## 2024-09-13 RX ORDER — LOSARTAN POTASSIUM 50 MG/1
50 TABLET ORAL DAILY
Start: 2024-09-13

## 2024-10-03 DIAGNOSIS — I10 PRIMARY HYPERTENSION: ICD-10-CM

## 2024-10-03 RX ORDER — LOSARTAN POTASSIUM 50 MG/1
50 TABLET ORAL DAILY
Qty: 90 TABLET | Refills: 1 | Status: SHIPPED | OUTPATIENT
Start: 2024-10-03

## 2024-10-17 DIAGNOSIS — I10 PRIMARY HYPERTENSION: ICD-10-CM

## 2024-10-17 DIAGNOSIS — E78.5 HYPERLIPIDEMIA, UNSPECIFIED HYPERLIPIDEMIA TYPE: ICD-10-CM

## 2024-10-17 RX ORDER — ROSUVASTATIN CALCIUM 40 MG/1
40 TABLET, COATED ORAL NIGHTLY
Qty: 90 TABLET | Refills: 3 | Status: SHIPPED | OUTPATIENT
Start: 2024-10-17 | End: 2025-10-12

## 2024-10-17 RX ORDER — AMLODIPINE BESYLATE 10 MG/1
10 TABLET ORAL DAILY
Qty: 90 TABLET | Refills: 3 | Status: SHIPPED | OUTPATIENT
Start: 2024-10-17

## 2024-11-20 DIAGNOSIS — I10 PRIMARY HYPERTENSION: ICD-10-CM

## 2024-11-20 RX ORDER — METOPROLOL SUCCINATE 50 MG/1
50 TABLET, EXTENDED RELEASE ORAL DAILY
Qty: 90 TABLET | Refills: 0 | Status: SHIPPED | OUTPATIENT
Start: 2024-11-20

## 2024-12-24 ENCOUNTER — OFFICE VISIT (OUTPATIENT)
Dept: FAMILY MEDICINE CLINIC | Facility: CLINIC | Age: 69
End: 2024-12-24
Payer: MEDICARE

## 2024-12-24 VITALS
OXYGEN SATURATION: 98 % | BODY MASS INDEX: 28.67 KG/M2 | SYSTOLIC BLOOD PRESSURE: 138 MMHG | TEMPERATURE: 98.3 F | WEIGHT: 182.7 LBS | HEIGHT: 67 IN | DIASTOLIC BLOOD PRESSURE: 78 MMHG | HEART RATE: 85 BPM | RESPIRATION RATE: 16 BRPM

## 2024-12-24 DIAGNOSIS — Z86.79 HISTORY OF ATRIAL FIBRILLATION: ICD-10-CM

## 2024-12-24 DIAGNOSIS — R73.09 ABNORMAL GLUCOSE: ICD-10-CM

## 2024-12-24 DIAGNOSIS — E78.5 HYPERLIPIDEMIA, UNSPECIFIED HYPERLIPIDEMIA TYPE: ICD-10-CM

## 2024-12-24 DIAGNOSIS — I10 PRIMARY HYPERTENSION: Primary | ICD-10-CM

## 2024-12-24 PROCEDURE — G0439 PPPS, SUBSEQ VISIT: HCPCS | Performed by: STUDENT IN AN ORGANIZED HEALTH CARE EDUCATION/TRAINING PROGRAM

## 2024-12-24 PROCEDURE — 3078F DIAST BP <80 MM HG: CPT | Performed by: STUDENT IN AN ORGANIZED HEALTH CARE EDUCATION/TRAINING PROGRAM

## 2024-12-24 PROCEDURE — 1159F MED LIST DOCD IN RCRD: CPT | Performed by: STUDENT IN AN ORGANIZED HEALTH CARE EDUCATION/TRAINING PROGRAM

## 2024-12-24 PROCEDURE — 1160F RVW MEDS BY RX/DR IN RCRD: CPT | Performed by: STUDENT IN AN ORGANIZED HEALTH CARE EDUCATION/TRAINING PROGRAM

## 2024-12-24 PROCEDURE — 1126F AMNT PAIN NOTED NONE PRSNT: CPT | Performed by: STUDENT IN AN ORGANIZED HEALTH CARE EDUCATION/TRAINING PROGRAM

## 2024-12-24 PROCEDURE — 1170F FXNL STATUS ASSESSED: CPT | Performed by: STUDENT IN AN ORGANIZED HEALTH CARE EDUCATION/TRAINING PROGRAM

## 2024-12-24 PROCEDURE — 3075F SYST BP GE 130 - 139MM HG: CPT | Performed by: STUDENT IN AN ORGANIZED HEALTH CARE EDUCATION/TRAINING PROGRAM

## 2024-12-24 NOTE — PROGRESS NOTES
Subjective   The ABCs of the Annual Wellness Visit  Medicare Wellness Visit      Elmer Groves is a 69 y.o. patient who presents for a Medicare Wellness Visit.    The following portions of the patient's history were reviewed and   updated as appropriate: allergies, current medications, past family history, past medical history, past social history, past surgical history, and problem list.    Compared to one year ago, the patient's physical   health is better.  Compared to one year ago, the patient's mental   health is the same.    Recent Hospitalizations:  He was not admitted to the hospital during the last year.     Current Medical Providers:  Patient Care Team:  Kurt Gomez MD as PCP - General (Internal Medicine)    Outpatient Medications Prior to Visit   Medication Sig Dispense Refill    amLODIPine (NORVASC) 10 MG tablet Take 1 tablet by mouth Daily. 90 tablet 3    losartan (Cozaar) 50 MG tablet Take 1 tablet by mouth Daily. 90 tablet 1    metoprolol succinate XL (TOPROL-XL) 50 MG 24 hr tablet Take 1 tablet by mouth once daily 90 tablet 0    rosuvastatin (CRESTOR) 40 MG tablet Take 1 tablet by mouth Every Night for 360 days. 90 tablet 3     No facility-administered medications prior to visit.     No opioid medication identified on active medication list. I have reviewed chart for other potential  high risk medication/s and harmful drug interactions in the elderly.      Aspirin is not on active medication list.  Aspirin use is not indicated based on review of current medical condition/s. Risk of harm outweighs potential benefits.  .    Patient Active Problem List   Diagnosis    Anorectal abscess    Anorectal fistula    Hypertension    Impaired fasting glucose    Hyperlipidemia    Vitamin D deficiency    Pain of left lower extremity    Myelopathy of lumbar region    Muscle weakness of lower extremity    Lumbar disc herniation with myelopathy    History of atrial fibrillation    Overweight (BMI 25.0-29.9)  "    Advance Care Planning Advance Directive is not on file.  ACP discussion was held with the patient during this visit. Patient has an advance directive (not in EMR), copy requested.            Objective   Vitals:    24 0850   BP: 138/78   BP Location: Left arm   Patient Position: Sitting   Cuff Size: Adult   Pulse: 85   Resp: 16   Temp: 98.3 °F (36.8 °C)   TempSrc: Oral   SpO2: 98%   Weight: 82.9 kg (182 lb 11.2 oz)   Height: 170.2 cm (67\")   PainSc: 0-No pain       Estimated body mass index is 28.61 kg/m² as calculated from the following:    Height as of this encounter: 170.2 cm (67\").    Weight as of this encounter: 82.9 kg (182 lb 11.2 oz).                Does the patient have evidence of cognitive impairment? No  Lab Results   Component Value Date    CHLPL 203 (H) 2024    TRIG 50 2024    HDL 93 (H) 2024     (H) 2024    VLDL 9 2024    HGBA1C 5.60 2024                                                                                                Health  Risk Assessment    Smoking Status:  Social History     Tobacco Use   Smoking Status Never    Passive exposure: Never   Smokeless Tobacco Never     Alcohol Consumption:  Social History     Substance and Sexual Activity   Alcohol Use Yes    Comment: 3 daily, past heavier use       Fall Risk Screen  STEADI Fall Risk Assessment was completed, and patient is at LOW risk for falls.Assessment completed on:2024    Depression Screening   Little interest or pleasure in doing things? Not at all   Feeling down, depressed, or hopeless? Not at all   PHQ-2 Total Score 0      Health Habits and Functional and Cognitive Screenin/24/2024     8:53 AM   Functional & Cognitive Status   Do you have difficulty preparing food and eating? No   Do you have difficulty bathing yourself, getting dressed or grooming yourself? No   Do you have difficulty using the toilet? No   Do you have difficulty moving around from place to " place? No   Do you have trouble with steps or getting out of a bed or a chair? No   Current Diet Well Balanced Diet   Dental Exam Up to date   Eye Exam Up to date   Exercise (times per week) 4 times per week   Current Exercises Include Gardening;Hiking;Home Exercise Program (TV, Computer, Etc.);Treadmill;Walking;Yard Work   Do you need help using the phone?  No   Are you deaf or do you have serious difficulty hearing?  No   Do you need help to go to places out of walking distance? No   Do you need help shopping? No   Do you need help preparing meals?  No   Do you need help with housework?  No   Do you need help with laundry? No   Do you need help taking your medications? No   Do you need help managing money? No   Do you ever drive or ride in a car without wearing a seat belt? Yes   Have you felt unusual stress, anger or loneliness in the last month? No   Who do you live with? Spouse   If you need help, do you have trouble finding someone available to you? No   Have you been bothered in the last four weeks by sexual problems? No   Do you have difficulty concentrating, remembering or making decisions? No           Age-appropriate Screening Schedule:  Refer to the list below for future screening recommendations based on patient's age, sex and/or medical conditions. Orders for these recommended tests are listed in the plan section. The patient has been provided with a written plan.    Health Maintenance List  Health Maintenance   Topic Date Due    COLORECTAL CANCER SCREENING  Never done    ZOSTER VACCINE (1 of 2) Never done    COVID-19 Vaccine (1 - 2024-25 season) Never done    ANNUAL WELLNESS VISIT  11/01/2024    INFLUENZA VACCINE  03/31/2025 (Originally 7/1/2024)    BMI FOLLOWUP  03/01/2025    LIPID PANEL  12/19/2025    TDAP/TD VACCINES (3 - Td or Tdap) 10/18/2033    HEPATITIS C SCREENING  Completed    Pneumococcal Vaccine 65+  Completed                                                                                                                                                 CMS Preventative Services Quick Reference  Risk Factors Identified During Encounter  Immunizations Discussed/Encouraged: Influenza, Shingrix, and COVID19    The above risks/problems have been discussed with the patient.  Pertinent information has been shared with the patient in the After Visit Summary.  An After Visit Summary and PPPS were made available to the patient.    Follow Up:   Next Medicare Wellness visit to be scheduled in 1 year.         Additional E&M Note during same encounter follows:  Patient has additional, significant, and separately identifiable condition(s)/problem(s) that require work above and beyond the Medicare Wellness Visit     Chief Complaint  Medicare Wellness-subsequent and Abnormal Lab (FROM 12/19/2024.)    Subjective    HPI         The patient is a 69-year-old gentleman here for a wellness checkup.    He has declined the influenza vaccine and the Shingrix vaccine for shingles. He underwent a colonoscopy approximately 8 to 10 years ago and is considering scheduling another one in the summer. He reports a slight improvement in his overall health compared to the previous year, with no changes in his mental health status. He has not required hospitalization within the past year. He does not report any memory issues, such as forgetting his home address. He does not require any medication refills at this time. He does not report experiencing abdominal pain, nausea, vomiting, urinary or stool issues, leg swelling, or open wounds on his body.    He has been adhering to his prescribed regimen of rosuvastatin 40 mg daily without missing any doses.    He has a history of atrial fibrillation, for which he underwent ablation therapy 15 years ago, and has remained stable since then. He was initially prescribed aspirin post-ablation but was later advised to discontinue its use.    MEDICATIONS  Rosuvastatin    IMMUNIZATIONS  He declined the  "influenza vaccine and the Shingrix vaccine for shingles.          Objective   Vital Signs:  /78 (BP Location: Left arm, Patient Position: Sitting, Cuff Size: Adult)   Pulse 85   Temp 98.3 °F (36.8 °C) (Oral)   Resp 16   Ht 170.2 cm (67\")   Wt 82.9 kg (182 lb 11.2 oz)   SpO2 98%   BMI 28.61 kg/m²   Physical Exam  HENT:      Mouth/Throat:      Mouth: Mucous membranes are moist.      Pharynx: Oropharynx is clear.   Cardiovascular:      Rate and Rhythm: Normal rate.      Pulses: Normal pulses.      Heart sounds: Normal heart sounds.   Pulmonary:      Effort: Pulmonary effort is normal.      Breath sounds: Normal breath sounds.   Abdominal:      General: Bowel sounds are normal.      Palpations: Abdomen is soft.   Skin:     General: Skin is warm.   Neurological:      Mental Status: He is alert and oriented to person, place, and time.           Lungs were auscultated.    Vital Signs  Blood pressure is 130/70.    The following data was reviewed by: Kurt Gomez MD on 12/24/2024:    CMP          6/13/2024    10:13 7/30/2024    13:21 12/19/2024    10:00   CMP   Glucose 115  134  105    BUN 8  10  11    Creatinine 0.77  0.77  0.81    EGFR  98.1     Sodium 138  137  139    Potassium 4.6  3.8  4.6    Chloride 101  99  102    Calcium 9.9  9.6  9.8    Total Protein 7.2   7.8    Total Protein  8.0     Albumin 4.4  4.9  4.6    Globulin 2.8   3.2    Globulin  3.1     Total Bilirubin 0.4  0.6  0.6    Alkaline Phosphatase 85  80  77    AST (SGOT) 29  30  29    ALT (SGPT) 24  27  24    Albumin/Globulin Ratio  1.6     BUN/Creatinine Ratio 10  13.0  13.6    Anion Gap  15.1       CBC          7/30/2024    13:21 12/19/2024    10:00   CBC   WBC 9.18  6.60    RBC 4.73  4.45    Hemoglobin 14.7  14.1    Hematocrit 44.2  42.5    MCV 93.4  95.5    MCH 31.1  31.7    MCHC 33.3  33.2    RDW 12.2  11.9    Platelets 253  267      Lipid Panel          6/13/2024    10:13 12/19/2024    10:00   Lipid Panel   Total Cholesterol 160  203  "   Triglycerides 87  50    HDL Cholesterol 66  93    VLDL Cholesterol 16  9    LDL Cholesterol  78  101    LDL/HDL Ratio 1.2       TSH          7/30/2024    13:21   TSH   TSH 0.992        Results  Laboratory Studies  A1c is normal. Fasting glucose is 105. Kidney function is normal. Liver function is normal. LDL cholesterol has increased slightly. White cell count is normal. PSA level is normal.              Assessment and Plan        1. Health maintenance.  His A1c levels have normalized, indicating effective dietary and exercise modifications. Fasting glucose has improved from 134 to 105. Renal and hepatic functions are within normal limits. There is a slight elevation in LDL cholesterol. Hematological parameters, including white cell count, are satisfactory. Prostate-Specific Antigen (PSA) levels are also within the normal range. He has declined the influenza and Shingrix vaccines. He is due for a colonoscopy but prefers to defer it until his next visit. A follow-up appointment is scheduled for 6 months, during which repeat laboratory tests will be conducted.    2. Hyperlipidemia.  His LDL cholesterol levels have increased slightly. He is currently taking rosuvastatin 40 mg daily without missing doses. He is advised to continue this medication as prescribed.    3. Atrial fibrillation.  He had an ablation approximately 15 years ago and has been in rhythm since then. He was initially prescribed aspirin but was later advised to discontinue it. No current issues reported.    Follow-up  The patient will follow up in 6 months.    PROCEDURE  The patient underwent a colonoscopy approximately 8 to 10 years ago.    The patient has a history of atrial fibrillation, for which he underwent ablation therapy 15 years ago.            Follow Up   No follow-ups on file.  Patient was given instructions and counseling regarding his condition or for health maintenance advice. Please see specific information pulled into the AVS if  appropriate.  Patient or patient representative verbalized consent for the use of Ambient Listening during the visit with  Kurt Gomez MD for chart documentation. 12/24/2024  10:41 EST

## 2025-02-15 DIAGNOSIS — I10 PRIMARY HYPERTENSION: ICD-10-CM

## 2025-02-17 RX ORDER — METOPROLOL SUCCINATE 50 MG/1
50 TABLET, EXTENDED RELEASE ORAL DAILY
Qty: 90 TABLET | Refills: 0 | Status: SHIPPED | OUTPATIENT
Start: 2025-02-17

## 2025-03-26 DIAGNOSIS — I10 PRIMARY HYPERTENSION: ICD-10-CM

## 2025-03-26 RX ORDER — LOSARTAN POTASSIUM 50 MG/1
50 TABLET ORAL DAILY
Qty: 90 TABLET | Refills: 3 | Status: SHIPPED | OUTPATIENT
Start: 2025-03-26

## 2025-05-14 DIAGNOSIS — I10 PRIMARY HYPERTENSION: ICD-10-CM

## 2025-05-20 RX ORDER — METOPROLOL SUCCINATE 50 MG/1
50 TABLET, EXTENDED RELEASE ORAL DAILY
Qty: 90 TABLET | Refills: 0 | Status: SHIPPED | OUTPATIENT
Start: 2025-05-20

## 2025-07-14 ENCOUNTER — OFFICE VISIT (OUTPATIENT)
Dept: FAMILY MEDICINE CLINIC | Facility: CLINIC | Age: 70
End: 2025-07-14
Payer: MEDICARE

## 2025-07-14 VITALS
RESPIRATION RATE: 17 BRPM | TEMPERATURE: 98.1 F | SYSTOLIC BLOOD PRESSURE: 130 MMHG | WEIGHT: 179.8 LBS | HEIGHT: 67 IN | DIASTOLIC BLOOD PRESSURE: 70 MMHG | OXYGEN SATURATION: 98 % | BODY MASS INDEX: 28.22 KG/M2

## 2025-07-14 DIAGNOSIS — R73.09 ABNORMAL GLUCOSE: ICD-10-CM

## 2025-07-14 DIAGNOSIS — E78.5 HYPERLIPIDEMIA, UNSPECIFIED HYPERLIPIDEMIA TYPE: ICD-10-CM

## 2025-07-14 DIAGNOSIS — Z86.79 HISTORY OF ATRIAL FIBRILLATION: ICD-10-CM

## 2025-07-14 DIAGNOSIS — I10 PRIMARY HYPERTENSION: Primary | ICD-10-CM

## 2025-07-14 DIAGNOSIS — Z12.5 PROSTATE CANCER SCREENING: ICD-10-CM

## 2025-07-14 DIAGNOSIS — Z12.12 ENCOUNTER FOR COLORECTAL CANCER SCREENING: ICD-10-CM

## 2025-07-14 DIAGNOSIS — Z12.11 ENCOUNTER FOR COLORECTAL CANCER SCREENING: ICD-10-CM

## 2025-07-14 PROCEDURE — 1126F AMNT PAIN NOTED NONE PRSNT: CPT | Performed by: STUDENT IN AN ORGANIZED HEALTH CARE EDUCATION/TRAINING PROGRAM

## 2025-07-14 PROCEDURE — 99214 OFFICE O/P EST MOD 30 MIN: CPT | Performed by: STUDENT IN AN ORGANIZED HEALTH CARE EDUCATION/TRAINING PROGRAM

## 2025-07-14 PROCEDURE — 1159F MED LIST DOCD IN RCRD: CPT | Performed by: STUDENT IN AN ORGANIZED HEALTH CARE EDUCATION/TRAINING PROGRAM

## 2025-07-14 PROCEDURE — 1160F RVW MEDS BY RX/DR IN RCRD: CPT | Performed by: STUDENT IN AN ORGANIZED HEALTH CARE EDUCATION/TRAINING PROGRAM

## 2025-07-14 PROCEDURE — 3078F DIAST BP <80 MM HG: CPT | Performed by: STUDENT IN AN ORGANIZED HEALTH CARE EDUCATION/TRAINING PROGRAM

## 2025-07-14 PROCEDURE — 3075F SYST BP GE 130 - 139MM HG: CPT | Performed by: STUDENT IN AN ORGANIZED HEALTH CARE EDUCATION/TRAINING PROGRAM

## 2025-07-14 RX ORDER — LOSARTAN POTASSIUM 50 MG/1
50 TABLET ORAL DAILY
Qty: 90 TABLET | Refills: 3 | Status: SHIPPED | OUTPATIENT
Start: 2025-07-14

## 2025-07-14 RX ORDER — ROSUVASTATIN CALCIUM 40 MG/1
40 TABLET, COATED ORAL NIGHTLY
Qty: 90 TABLET | Refills: 3 | Status: SHIPPED | OUTPATIENT
Start: 2025-07-14 | End: 2026-07-09

## 2025-07-14 RX ORDER — AMLODIPINE BESYLATE 10 MG/1
10 TABLET ORAL DAILY
Qty: 90 TABLET | Refills: 3 | Status: SHIPPED | OUTPATIENT
Start: 2025-07-14

## 2025-07-14 RX ORDER — METOPROLOL SUCCINATE 50 MG/1
50 TABLET, EXTENDED RELEASE ORAL DAILY
Qty: 90 TABLET | Refills: 3 | Status: SHIPPED | OUTPATIENT
Start: 2025-07-14

## 2025-07-16 NOTE — PROGRESS NOTES
"Chief Complaint  Primary Care Follow-Up (Lab F/U)    Subjective    History of Present Illness  The patient is a 69-year-old gentleman here for a lab follow-up.    He has been monitoring his blood pressure at home and brought the readings from the past week. Home readings are within the goal range. He reports no new symptoms, including leg swelling. Current medications include amlodipine 10 mg daily, losartan 50 mg, metoprolol succinate 50 mg, and rosuvastatin 40 mg daily for cholesterol management.    He has a history of atrial fibrillation and underwent an ablation procedure 15 years ago. A year ago, he was prediabetic, but recent A1c results indicate normal blood sugar levels.    He expressed interest in undergoing a Cologuard test for colon cancer screening and reports no family history of colon cancer.    PAST SURGICAL HISTORY:  Atrial fibrillation ablation 15 years ago.    FAMILY HISTORY  He does not have a family history of colon cancer.       Elmer Groves presents to [facility]  Primary Care Follow-Up      Objective   Vital Signs:  /70   Temp 98.1 °F (36.7 °C) (Oral)   Resp 17   Ht 170.2 cm (67.01\")   Wt 81.6 kg (179 lb 12.8 oz)   SpO2 98%   BMI 28.15 kg/m²   Estimated body mass index is 28.15 kg/m² as calculated from the following:    Height as of this encounter: 170.2 cm (67.01\").    Weight as of this encounter: 81.6 kg (179 lb 12.8 oz).    BMI is >= 25 and <30. (Overweight) The following options were offered after discussion;: exercise counseling/recommendations and nutrition counseling/recommendations      Physical Exam  Cardiovascular:      Rate and Rhythm: Normal rate.      Pulses: Normal pulses.      Heart sounds: Normal heart sounds.   Pulmonary:      Effort: Pulmonary effort is normal.      Breath sounds: Normal breath sounds.   Abdominal:      General: Bowel sounds are normal.      Palpations: Abdomen is soft.   Neurological:      Mental Status: He is " alert and oriented to person, place, and time.        Result Review :  The following data was reviewed by: Kurt Gomez MD on 07/14/2025:  CMP          7/30/2024    13:21 12/19/2024    10:00 7/9/2025    08:11   CMP   Glucose 134  105  111    BUN 10  11  11.0    Creatinine 0.77  0.81  0.88    EGFR 98.1  96.0  93.7    Sodium 137  139  136    Potassium 3.8  4.6  4.8    Chloride 99  102  100    Calcium 9.6  9.8  9.5    Total Protein 8.0  7.8  7.3    Albumin 4.9  4.6  4.6    Globulin 3.1  3.2  2.7    Total Bilirubin 0.6  0.6  0.4    Alkaline Phosphatase 80  77  66    AST (SGOT) 30  29  28    ALT (SGPT) 27  24  21    Albumin/Globulin Ratio 1.6  1.4  1.7    BUN/Creatinine Ratio 13.0  13.6  12.5    Anion Gap 15.1        CBC          7/30/2024    13:21 12/19/2024    10:00   CBC   WBC 9.18  6.60    RBC 4.73  4.45    Hemoglobin 14.7  14.1    Hematocrit 44.2  42.5    MCV 93.4  95.5    MCH 31.1  31.7    MCHC 33.3  33.2    RDW 12.2  11.9    Platelets 253  267      Lipid Panel          12/19/2024    10:00 7/9/2025    08:11   Lipid Panel   Total Cholesterol 203  191    Triglycerides 50  50    HDL Cholesterol 93  92    VLDL Cholesterol 9  10    LDL Cholesterol  101  89      TSH          7/30/2024    13:21   TSH   TSH 0.992                Assessment and Plan   Diagnoses and all orders for this visit:    1. Primary hypertension (Primary)  -     losartan (COZAAR) 50 MG tablet; Take 1 tablet by mouth Daily.  Dispense: 90 tablet; Refill: 3  -     metoprolol succinate XL (TOPROL-XL) 50 MG 24 hr tablet; Take 1 tablet by mouth Daily.  Dispense: 90 tablet; Refill: 3  -     amLODIPine (NORVASC) 10 MG tablet; Take 1 tablet by mouth Daily.  Dispense: 90 tablet; Refill: 3  -     CBC Auto Differential; Future  -     Comprehensive Metabolic Panel; Future  -     Lipid Panel With / Chol / HDL Ratio; Future  -     TSH; Future  -     Urinalysis With Microscopic - Urine, Clean Catch; Future  -     Hemoglobin A1c; Future  -     PSA SCREENING;  Future    2. Hyperlipidemia, unspecified hyperlipidemia type  -     rosuvastatin (CRESTOR) 40 MG tablet; Take 1 tablet by mouth Every Night for 360 days.  Dispense: 90 tablet; Refill: 3  -     CBC Auto Differential; Future  -     Comprehensive Metabolic Panel; Future  -     Lipid Panel With / Chol / HDL Ratio; Future  -     TSH; Future  -     Urinalysis With Microscopic - Urine, Clean Catch; Future  -     Hemoglobin A1c; Future  -     PSA SCREENING; Future    3. Encounter for colorectal cancer screening  -     Cologuard - Stool, Per Rectum; Future    4. Prostate cancer screening  -     CBC Auto Differential; Future  -     Comprehensive Metabolic Panel; Future  -     Lipid Panel With / Chol / HDL Ratio; Future  -     TSH; Future  -     Urinalysis With Microscopic - Urine, Clean Catch; Future  -     Hemoglobin A1c; Future  -     PSA SCREENING; Future    5. Abnormal glucose  -     Hemoglobin A1c; Future    6. History of atrial fibrillation        Assessment & Plan  1. Hypertension.  - Blood pressure readings at home are within the target range, although slightly elevated during office visits.  - Current medications include amlodipine 10 mg daily, losartan 50 mg, and metoprolol succinate 50 mg.  - Advised to continue current diet and lifestyle habits.  - Prescription for amlodipine 10 mg daily, losartan 50 mg, and metoprolol succinate 50 mg sent to pharmacy with refills.    2. Hyperlipidemia.  - Cholesterol levels are well-managed with the current medication regimen.  - Current medication includes rosuvastatin 40 mg daily.  - Advised to continue taking rosuvastatin 40 mg daily.  - Prescription for rosuvastatin 40 mg daily sent to pharmacy with refills.    3. Atrial Fibrillation.  - History of atrial fibrillation with ablation performed 15 years ago.  - No new symptoms reported.  - Monitoring for any recurrence or new symptoms.    4. Prediabetes.  - A1c levels have improved significantly, indicating he is no longer  prediabetic.  - Fasting glucose levels are slightly elevated compared to the previous reading, but overall glucose control over the past 3 months is excellent.  - Advised to continue current diet and lifestyle habits.  - Monitoring glucose levels and A1c periodically.    5. Health Maintenance.  - Due for colon cancer screening and has decided to use the Cologuard test.  - Order for the Cologuard test has been placed.  - Instructions provided on how to collect and send the sample.  - Follow-up visit scheduled in 6 months.            Follow Up   No follow-ups on file.  Patient was given instructions and counseling regarding his condition or for health maintenance advice. Please see specific information pulled into the AVS if appropriate.     Patient or patient representative verbalized consent for the use of Ambient Listening during the visit with  Kurt Gomez MD for chart documentation. 7/16/2025  10:13 EDT